# Patient Record
Sex: MALE | Race: WHITE
[De-identification: names, ages, dates, MRNs, and addresses within clinical notes are randomized per-mention and may not be internally consistent; named-entity substitution may affect disease eponyms.]

---

## 2021-05-04 ENCOUNTER — HOSPITAL ENCOUNTER (INPATIENT)
Age: 76
LOS: 11 days | Discharge: HOME | DRG: 949 | End: 2021-05-15
Payer: MEDICARE

## 2021-05-04 VITALS
DIASTOLIC BLOOD PRESSURE: 81 MMHG | TEMPERATURE: 98.5 F | OXYGEN SATURATION: 94 % | HEART RATE: 84 BPM | RESPIRATION RATE: 18 BRPM | SYSTOLIC BLOOD PRESSURE: 158 MMHG

## 2021-05-04 VITALS
TEMPERATURE: 98.2 F | OXYGEN SATURATION: 96 % | SYSTOLIC BLOOD PRESSURE: 129 MMHG | DIASTOLIC BLOOD PRESSURE: 65 MMHG | HEART RATE: 89 BPM | RESPIRATION RATE: 18 BRPM

## 2021-05-04 VITALS — BODY MASS INDEX: 21.9 KG/M2 | BODY MASS INDEX: 23.1 KG/M2

## 2021-05-04 VITALS — RESPIRATION RATE: 16 BRPM | OXYGEN SATURATION: 94 % | HEART RATE: 18 BPM

## 2021-05-04 VITALS — OXYGEN SATURATION: 96 %

## 2021-05-04 VITALS — SYSTOLIC BLOOD PRESSURE: 158 MMHG | DIASTOLIC BLOOD PRESSURE: 81 MMHG | HEART RATE: 84 BPM

## 2021-05-04 DIAGNOSIS — F41.9: ICD-10-CM

## 2021-05-04 DIAGNOSIS — M79.2: ICD-10-CM

## 2021-05-04 DIAGNOSIS — N52.9: ICD-10-CM

## 2021-05-04 DIAGNOSIS — E87.6: ICD-10-CM

## 2021-05-04 DIAGNOSIS — F10.21: ICD-10-CM

## 2021-05-04 DIAGNOSIS — Z79.899: ICD-10-CM

## 2021-05-04 DIAGNOSIS — I12.9: ICD-10-CM

## 2021-05-04 DIAGNOSIS — Z98.1: ICD-10-CM
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
## 2021-05-04 DIAGNOSIS — S06.5X9D: Primary | ICD-10-CM

## 2021-05-04 DIAGNOSIS — Z87.891: ICD-10-CM

## 2021-05-04 DIAGNOSIS — R47.01: ICD-10-CM

## 2021-05-04 DIAGNOSIS — N18.9: ICD-10-CM

## 2021-05-04 DIAGNOSIS — E55.9: ICD-10-CM

## 2021-05-04 PROCEDURE — 36415 COLL VENOUS BLD VENIPUNCTURE: CPT

## 2021-05-04 PROCEDURE — 97535 SELF CARE MNGMENT TRAINING: CPT

## 2021-05-04 PROCEDURE — 80053 COMPREHEN METABOLIC PANEL: CPT

## 2021-05-04 PROCEDURE — 97116 GAIT TRAINING THERAPY: CPT

## 2021-05-04 PROCEDURE — 80048 BASIC METABOLIC PNL TOTAL CA: CPT

## 2021-05-04 PROCEDURE — 87426 SARSCOV CORONAVIRUS AG IA: CPT

## 2021-05-04 PROCEDURE — 97166 OT EVAL MOD COMPLEX 45 MIN: CPT

## 2021-05-04 PROCEDURE — 92523 SPEECH SOUND LANG COMPREHEN: CPT

## 2021-05-04 PROCEDURE — 97110 THERAPEUTIC EXERCISES: CPT

## 2021-05-04 PROCEDURE — 85025 COMPLETE CBC W/AUTO DIFF WBC: CPT

## 2021-05-04 PROCEDURE — 97760 ORTHOTIC MGMT&TRAING 1ST ENC: CPT

## 2021-05-04 PROCEDURE — 97112 NEUROMUSCULAR REEDUCATION: CPT

## 2021-05-04 PROCEDURE — 92507 TX SP LANG VOICE COMM INDIV: CPT

## 2021-05-04 PROCEDURE — 83735 ASSAY OF MAGNESIUM: CPT

## 2021-05-04 PROCEDURE — 97802 MEDICAL NUTRITION INDIV IN: CPT

## 2021-05-04 PROCEDURE — 97530 THERAPEUTIC ACTIVITIES: CPT

## 2021-05-04 PROCEDURE — 97162 PT EVAL MOD COMPLEX 30 MIN: CPT

## 2021-05-04 PROCEDURE — 84100 ASSAY OF PHOSPHORUS: CPT

## 2021-05-05 VITALS
TEMPERATURE: 97.7 F | OXYGEN SATURATION: 95 % | DIASTOLIC BLOOD PRESSURE: 59 MMHG | HEART RATE: 66 BPM | SYSTOLIC BLOOD PRESSURE: 113 MMHG | RESPIRATION RATE: 16 BRPM

## 2021-05-05 VITALS
RESPIRATION RATE: 16 BRPM | DIASTOLIC BLOOD PRESSURE: 80 MMHG | HEART RATE: 72 BPM | OXYGEN SATURATION: 94 % | TEMPERATURE: 97.88 F | SYSTOLIC BLOOD PRESSURE: 145 MMHG

## 2021-05-05 VITALS — HEART RATE: 72 BPM | DIASTOLIC BLOOD PRESSURE: 59 MMHG | SYSTOLIC BLOOD PRESSURE: 113 MMHG

## 2021-05-05 VITALS — HEART RATE: 72 BPM | RESPIRATION RATE: 96 BRPM | OXYGEN SATURATION: 94 %

## 2021-05-05 VITALS — HEART RATE: 72 BPM

## 2021-05-05 VITALS — OXYGEN SATURATION: 97 %

## 2021-05-05 LAB
ALANINE AMINOTRANSFER ALT/SGPT: 19 U/L (ref 16–61)
ALBUMIN SERPL-MCNC: 3.5 G/DL (ref 3.2–5)
ALKALINE PHOSPHATASE: 73 U/L (ref 45–117)
ANION GAP: 6 (ref 5–15)
AST(SGOT): 14 U/L (ref 15–37)
BUN SERPL-MCNC: 20 MG/DL (ref 7–18)
BUN/CREAT RATIO: 23.3 RATIO (ref 10–20)
CALCIUM SERPL-MCNC: 9.3 MG/DL (ref 8.5–10.1)
CARBON DIOXIDE: 28 MMOL/L (ref 21–32)
CHLORIDE: 102 MMOL/L (ref 98–107)
DEPRECATED RDW RBC: 43.8 FL (ref 35.1–43.9)
ERYTHROCYTE [DISTWIDTH] IN BLOOD: 12.5 % (ref 11.6–14.6)
EST GLOM FILT RATE - AFR AMER: 111 ML/MIN (ref 60–?)
ESTIMATED CREATININE CLEARANCE: 70.79 ML/MIN
GLOBULIN: 3.5 G/DL (ref 2.2–4.2)
GLUCOSE: 102 MG/DL (ref 74–106)
HCT VFR BLD AUTO: 43.9 % (ref 40–54)
HEMOGLOBIN: 14.8 G/DL (ref 13–16.5)
HGB BLD-MCNC: 14.8 G/DL (ref 13–16.5)
IMMATURE GRANULOCYTES COUNT: 0.01 X10^3/UL (ref 0–0)
MAGNESIUM: 2.1 MG/DL (ref 1.6–2.6)
MCV RBC: 95.6 FL (ref 80–94)
MEAN CORP HGB CONC: 33.7 G/DL (ref 32–36)
MEAN PLATELET VOL.: 9.6 FL (ref 6.2–12)
NRBC FLAGGED BY ANALYZER: 0 % (ref 0–5)
PLATELET # BLD: 191 K/MM3 (ref 150–450)
PLATELET COUNT: 191 K/MM3 (ref 150–450)
POTASSIUM: 3.6 MMOL/L (ref 3.5–5.1)
RBC # BLD AUTO: 4.59 M/MM3 (ref 4.6–6.2)
RBC DISTRIBUTION WIDTH CV: 12.5 % (ref 11.6–14.6)
RBC DISTRIBUTION WIDTH SD: 43.8 FL (ref 35.1–43.9)
WBC # BLD AUTO: 5.1 K/MM3 (ref 4.4–11)
WHITE BLOOD COUNT: 5.1 K/MM3 (ref 4.4–11)

## 2021-05-06 VITALS — OXYGEN SATURATION: 95 %

## 2021-05-06 VITALS
DIASTOLIC BLOOD PRESSURE: 73 MMHG | SYSTOLIC BLOOD PRESSURE: 149 MMHG | TEMPERATURE: 98.1 F | HEART RATE: 71 BPM | OXYGEN SATURATION: 93 % | RESPIRATION RATE: 18 BRPM

## 2021-05-06 VITALS
DIASTOLIC BLOOD PRESSURE: 73 MMHG | SYSTOLIC BLOOD PRESSURE: 147 MMHG | TEMPERATURE: 98.6 F | RESPIRATION RATE: 18 BRPM | OXYGEN SATURATION: 96 % | HEART RATE: 75 BPM

## 2021-05-06 VITALS — SYSTOLIC BLOOD PRESSURE: 134 MMHG | DIASTOLIC BLOOD PRESSURE: 71 MMHG | HEART RATE: 76 BPM

## 2021-05-06 VITALS — HEART RATE: 71 BPM

## 2021-05-07 VITALS
DIASTOLIC BLOOD PRESSURE: 71 MMHG | OXYGEN SATURATION: 94 % | SYSTOLIC BLOOD PRESSURE: 119 MMHG | RESPIRATION RATE: 12 BRPM | TEMPERATURE: 98.78 F | HEART RATE: 72 BPM

## 2021-05-07 VITALS
HEART RATE: 66 BPM | DIASTOLIC BLOOD PRESSURE: 72 MMHG | OXYGEN SATURATION: 93 % | TEMPERATURE: 98.2 F | RESPIRATION RATE: 12 BRPM | SYSTOLIC BLOOD PRESSURE: 125 MMHG

## 2021-05-07 VITALS — DIASTOLIC BLOOD PRESSURE: 72 MMHG | HEART RATE: 66 BPM | SYSTOLIC BLOOD PRESSURE: 125 MMHG

## 2021-05-07 VITALS — HEART RATE: 72 BPM

## 2021-05-08 VITALS
HEART RATE: 79 BPM | SYSTOLIC BLOOD PRESSURE: 124 MMHG | TEMPERATURE: 98 F | RESPIRATION RATE: 15 BRPM | OXYGEN SATURATION: 94 % | DIASTOLIC BLOOD PRESSURE: 70 MMHG

## 2021-05-08 VITALS — HEART RATE: 64 BPM | DIASTOLIC BLOOD PRESSURE: 70 MMHG | SYSTOLIC BLOOD PRESSURE: 124 MMHG

## 2021-05-08 VITALS
TEMPERATURE: 97.88 F | RESPIRATION RATE: 18 BRPM | OXYGEN SATURATION: 94 % | HEART RATE: 72 BPM | SYSTOLIC BLOOD PRESSURE: 134 MMHG | DIASTOLIC BLOOD PRESSURE: 80 MMHG

## 2021-05-08 VITALS — HEART RATE: 78 BPM

## 2021-05-09 VITALS
OXYGEN SATURATION: 93 % | TEMPERATURE: 98.8 F | HEART RATE: 74 BPM | SYSTOLIC BLOOD PRESSURE: 160 MMHG | RESPIRATION RATE: 18 BRPM | DIASTOLIC BLOOD PRESSURE: 80 MMHG

## 2021-05-09 VITALS
SYSTOLIC BLOOD PRESSURE: 136 MMHG | TEMPERATURE: 98 F | HEART RATE: 79 BPM | DIASTOLIC BLOOD PRESSURE: 82 MMHG | OXYGEN SATURATION: 96 % | RESPIRATION RATE: 16 BRPM

## 2021-05-09 VITALS — HEART RATE: 79 BPM | SYSTOLIC BLOOD PRESSURE: 136 MMHG | DIASTOLIC BLOOD PRESSURE: 82 MMHG

## 2021-05-09 VITALS — HEART RATE: 74 BPM

## 2021-05-10 VITALS
OXYGEN SATURATION: 96 % | RESPIRATION RATE: 16 BRPM | HEART RATE: 75 BPM | DIASTOLIC BLOOD PRESSURE: 81 MMHG | SYSTOLIC BLOOD PRESSURE: 155 MMHG

## 2021-05-10 VITALS — HEART RATE: 75 BPM | SYSTOLIC BLOOD PRESSURE: 155 MMHG | DIASTOLIC BLOOD PRESSURE: 81 MMHG

## 2021-05-10 VITALS
OXYGEN SATURATION: 93 % | SYSTOLIC BLOOD PRESSURE: 129 MMHG | TEMPERATURE: 98.42 F | HEART RATE: 76 BPM | DIASTOLIC BLOOD PRESSURE: 85 MMHG | RESPIRATION RATE: 12 BRPM

## 2021-05-10 VITALS
TEMPERATURE: 98.3 F | OXYGEN SATURATION: 93 % | SYSTOLIC BLOOD PRESSURE: 154 MMHG | RESPIRATION RATE: 18 BRPM | HEART RATE: 93 BPM | DIASTOLIC BLOOD PRESSURE: 84 MMHG

## 2021-05-10 VITALS — HEART RATE: 76 BPM

## 2021-05-11 VITALS
RESPIRATION RATE: 16 BRPM | DIASTOLIC BLOOD PRESSURE: 88 MMHG | SYSTOLIC BLOOD PRESSURE: 139 MMHG | HEART RATE: 83 BPM | OXYGEN SATURATION: 98 % | TEMPERATURE: 98.24 F

## 2021-05-11 VITALS — HEART RATE: 67 BPM

## 2021-05-11 VITALS — DIASTOLIC BLOOD PRESSURE: 88 MMHG | SYSTOLIC BLOOD PRESSURE: 139 MMHG | HEART RATE: 83 BPM

## 2021-05-11 VITALS
TEMPERATURE: 98.3 F | DIASTOLIC BLOOD PRESSURE: 74 MMHG | RESPIRATION RATE: 18 BRPM | HEART RATE: 67 BPM | SYSTOLIC BLOOD PRESSURE: 151 MMHG | OXYGEN SATURATION: 95 %

## 2021-05-11 LAB
ANION GAP: 5 (ref 5–15)
BUN SERPL-MCNC: 25 MG/DL (ref 7–18)
BUN/CREAT RATIO: 29.3 RATIO (ref 10–20)
CALCIUM SERPL-MCNC: 9.1 MG/DL (ref 8.5–10.1)
CARBON DIOXIDE: 29 MMOL/L (ref 21–32)
CHLORIDE: 103 MMOL/L (ref 98–107)
EST GLOM FILT RATE - AFR AMER: 112 ML/MIN (ref 60–?)
ESTIMATED CREATININE CLEARANCE: 68.18 ML/MIN
GLUCOSE: 96 MG/DL (ref 74–106)
MAGNESIUM: 1.9 MG/DL (ref 1.6–2.6)
POTASSIUM: 3.8 MMOL/L (ref 3.5–5.1)

## 2021-05-12 VITALS
SYSTOLIC BLOOD PRESSURE: 146 MMHG | TEMPERATURE: 98.42 F | OXYGEN SATURATION: 94 % | DIASTOLIC BLOOD PRESSURE: 87 MMHG | HEART RATE: 76 BPM | RESPIRATION RATE: 16 BRPM

## 2021-05-12 VITALS — HEART RATE: 71 BPM

## 2021-05-12 VITALS — HEART RATE: 76 BPM | SYSTOLIC BLOOD PRESSURE: 146 MMHG | DIASTOLIC BLOOD PRESSURE: 87 MMHG

## 2021-05-12 VITALS
HEART RATE: 71 BPM | RESPIRATION RATE: 16 BRPM | TEMPERATURE: 98.24 F | DIASTOLIC BLOOD PRESSURE: 81 MMHG | OXYGEN SATURATION: 93 % | SYSTOLIC BLOOD PRESSURE: 135 MMHG

## 2021-05-13 VITALS
SYSTOLIC BLOOD PRESSURE: 148 MMHG | HEART RATE: 73 BPM | OXYGEN SATURATION: 96 % | DIASTOLIC BLOOD PRESSURE: 85 MMHG | RESPIRATION RATE: 16 BRPM | TEMPERATURE: 98.1 F

## 2021-05-13 VITALS — SYSTOLIC BLOOD PRESSURE: 148 MMHG | HEART RATE: 73 BPM | DIASTOLIC BLOOD PRESSURE: 85 MMHG

## 2021-05-13 VITALS
SYSTOLIC BLOOD PRESSURE: 144 MMHG | RESPIRATION RATE: 18 BRPM | HEART RATE: 79 BPM | OXYGEN SATURATION: 94 % | TEMPERATURE: 97.16 F | DIASTOLIC BLOOD PRESSURE: 88 MMHG

## 2021-05-13 VITALS — HEART RATE: 79 BPM

## 2021-05-14 VITALS
OXYGEN SATURATION: 96 % | TEMPERATURE: 98.96 F | HEART RATE: 68 BPM | SYSTOLIC BLOOD PRESSURE: 156 MMHG | DIASTOLIC BLOOD PRESSURE: 89 MMHG | RESPIRATION RATE: 16 BRPM

## 2021-05-14 VITALS — DIASTOLIC BLOOD PRESSURE: 89 MMHG | SYSTOLIC BLOOD PRESSURE: 156 MMHG | HEART RATE: 68 BPM

## 2021-05-14 VITALS
DIASTOLIC BLOOD PRESSURE: 76 MMHG | HEART RATE: 70 BPM | OXYGEN SATURATION: 94 % | SYSTOLIC BLOOD PRESSURE: 144 MMHG | TEMPERATURE: 97.88 F | RESPIRATION RATE: 16 BRPM

## 2021-05-14 VITALS — HEART RATE: 76 BPM

## 2021-05-14 LAB
ANION GAP: 4 (ref 5–15)
BUN SERPL-MCNC: 20 MG/DL (ref 7–18)
BUN/CREAT RATIO: 24.8 RATIO (ref 10–20)
CALCIUM SERPL-MCNC: 8.9 MG/DL (ref 8.5–10.1)
CARBON DIOXIDE: 28 MMOL/L (ref 21–32)
CHLORIDE: 109 MMOL/L (ref 98–107)
EST GLOM FILT RATE - AFR AMER: 119 ML/MIN (ref 60–?)
ESTIMATED CREATININE CLEARANCE: 70.91 ML/MIN
GLUCOSE: 96 MG/DL (ref 74–106)
MAGNESIUM: 2.2 MG/DL (ref 1.6–2.6)
POTASSIUM: 3.9 MMOL/L (ref 3.5–5.1)

## 2021-05-15 VITALS
TEMPERATURE: 97.88 F | DIASTOLIC BLOOD PRESSURE: 77 MMHG | OXYGEN SATURATION: 94 % | SYSTOLIC BLOOD PRESSURE: 145 MMHG | HEART RATE: 68 BPM | RESPIRATION RATE: 16 BRPM

## 2021-05-15 VITALS — HEART RATE: 72 BPM

## 2021-05-15 VITALS
SYSTOLIC BLOOD PRESSURE: 145 MMHG | RESPIRATION RATE: 16 BRPM | DIASTOLIC BLOOD PRESSURE: 77 MMHG | HEART RATE: 69 BPM | OXYGEN SATURATION: 94 % | TEMPERATURE: 97.88 F

## 2023-02-04 PROBLEM — G89.29 CHRONIC PAIN: Status: ACTIVE | Noted: 2023-02-04

## 2023-02-04 PROBLEM — R11.2 NAUSEA AND/OR VOMITING: Status: RESOLVED | Noted: 2023-02-04 | Resolved: 2023-02-04

## 2023-02-04 PROBLEM — N40.1 BENIGN PROSTATIC HYPERPLASIA WITH NOCTURIA: Status: ACTIVE | Noted: 2023-02-04

## 2023-02-04 PROBLEM — G47.00 INSOMNIA: Status: ACTIVE | Noted: 2023-02-04

## 2023-02-04 PROBLEM — M54.9 BACK PAIN: Status: RESOLVED | Noted: 2023-02-04 | Resolved: 2023-02-04

## 2023-02-04 PROBLEM — R27.9 LACK OF COORDINATION: Status: ACTIVE | Noted: 2023-02-04

## 2023-02-04 PROBLEM — R35.1 BENIGN PROSTATIC HYPERPLASIA WITH NOCTURIA: Status: ACTIVE | Noted: 2023-02-04

## 2023-02-04 PROBLEM — K21.9 GERD (GASTROESOPHAGEAL REFLUX DISEASE): Status: ACTIVE | Noted: 2023-02-04

## 2023-02-04 PROBLEM — R55 SYNCOPE: Status: RESOLVED | Noted: 2023-02-04 | Resolved: 2023-02-04

## 2023-02-04 PROBLEM — R79.89 LOW SERUM TESTOSTERONE LEVEL: Status: ACTIVE | Noted: 2023-02-04

## 2023-02-04 PROBLEM — J45.909 RAD (REACTIVE AIRWAY DISEASE) (HHS-HCC): Status: ACTIVE | Noted: 2023-02-04

## 2023-02-04 PROBLEM — R25.2 LEG CRAMPS: Status: ACTIVE | Noted: 2023-02-04

## 2023-02-04 PROBLEM — S06.5XAA POST-TRAUMATIC SUBDURAL HEMATOMA (MULTI): Status: ACTIVE | Noted: 2023-02-04

## 2023-02-04 PROBLEM — M48.062 NEUROGENIC CLAUDICATION DUE TO LUMBAR SPINAL STENOSIS: Status: RESOLVED | Noted: 2023-02-04 | Resolved: 2023-02-04

## 2023-02-04 PROBLEM — S10.16XA TICK BITE OF THROAT: Status: RESOLVED | Noted: 2023-02-04 | Resolved: 2023-02-04

## 2023-02-04 PROBLEM — G62.9 PERIPHERAL NEUROPATHY: Status: ACTIVE | Noted: 2023-02-04
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
## 2023-02-04 PROBLEM — N52.9 ERECTILE DYSFUNCTION: Status: ACTIVE | Noted: 2023-02-04

## 2023-02-04 PROBLEM — R50.9 FEVER: Status: RESOLVED | Noted: 2023-02-04 | Resolved: 2023-02-04

## 2023-02-04 PROBLEM — G25.81 RESTLESS LEGS SYNDROME: Status: ACTIVE | Noted: 2023-02-04

## 2023-02-04 PROBLEM — A69.20 LYME DISEASE: Status: ACTIVE | Noted: 2023-02-04

## 2023-02-04 PROBLEM — E29.1 HYPOGONADISM IN MALE: Status: ACTIVE | Noted: 2023-02-04

## 2023-02-04 PROBLEM — R42 DIZZINESS: Status: ACTIVE | Noted: 2023-02-04

## 2023-02-04 PROBLEM — R00.2 PALPITATIONS: Status: ACTIVE | Noted: 2023-02-04

## 2023-02-04 PROBLEM — I49.3 PVC (PREMATURE VENTRICULAR CONTRACTION): Status: ACTIVE | Noted: 2023-02-04

## 2023-02-04 PROBLEM — I10 BENIGN ESSENTIAL HYPERTENSION: Status: ACTIVE | Noted: 2023-02-04

## 2023-02-04 PROBLEM — M54.12 CERVICAL RADICULOPATHY: Status: ACTIVE | Noted: 2023-02-04

## 2023-02-04 PROBLEM — F41.9 ANXIETY: Status: ACTIVE | Noted: 2023-02-04

## 2023-02-04 PROBLEM — R20.0 NUMBNESS AND TINGLING IN BOTH HANDS: Status: ACTIVE | Noted: 2023-02-04

## 2023-02-04 PROBLEM — M54.6 THORACIC BACK PAIN: Status: ACTIVE | Noted: 2023-02-04

## 2023-02-04 PROBLEM — M25.50 ARTHRALGIA: Status: ACTIVE | Noted: 2023-02-04

## 2023-02-04 PROBLEM — R26.89 BALANCE PROBLEMS: Status: ACTIVE | Noted: 2023-02-04

## 2023-02-04 PROBLEM — R53.1 RIGHT SIDED WEAKNESS: Status: ACTIVE | Noted: 2023-02-04

## 2023-02-04 PROBLEM — R20.2 NUMBNESS AND TINGLING IN BOTH HANDS: Status: ACTIVE | Noted: 2023-02-04

## 2023-02-04 PROBLEM — M54.17 LUMBOSACRAL RADICULOPATHY: Status: ACTIVE | Noted: 2023-02-04

## 2023-02-04 PROBLEM — R53.81 DEBILITY: Status: ACTIVE | Noted: 2023-02-04

## 2023-02-04 PROBLEM — T78.40XA ALLERGIES: Status: ACTIVE | Noted: 2023-02-04

## 2023-02-04 PROBLEM — G45.9 TIA (TRANSIENT ISCHEMIC ATTACK): Status: ACTIVE | Noted: 2023-02-04

## 2023-02-04 PROBLEM — M54.50 LOW BACK PAIN: Status: RESOLVED | Noted: 2023-02-04 | Resolved: 2023-02-04

## 2023-02-04 PROBLEM — R41.841 COGNITIVE COMMUNICATION DEFICIT: Status: ACTIVE | Noted: 2023-02-04

## 2023-02-04 PROBLEM — R13.10 ODYNOPHAGIA: Status: ACTIVE | Noted: 2023-02-04

## 2023-02-04 PROBLEM — M54.2 NECK PAIN: Status: ACTIVE | Noted: 2023-02-04

## 2023-02-04 PROBLEM — R74.8 ELEVATED LIVER ENZYMES: Status: ACTIVE | Noted: 2023-02-04

## 2023-02-04 PROBLEM — E87.6 HYPOKALEMIA: Status: RESOLVED | Noted: 2023-02-04 | Resolved: 2023-02-04

## 2023-02-04 RX ORDER — ACETAMINOPHEN 500 MG/1
CAPSULE, LIQUID FILLED ORAL
COMMUNITY

## 2023-02-04 RX ORDER — AMLODIPINE BESYLATE 2.5 MG/1
1 TABLET ORAL DAILY
COMMUNITY
Start: 2021-06-02 | End: 2023-03-23

## 2023-02-04 RX ORDER — HYDROCODONE BITARTRATE AND ACETAMINOPHEN 7.5; 325 MG/1; MG/1
1 TABLET ORAL 3 TIMES DAILY PRN
COMMUNITY
Start: 2019-10-22 | End: 2023-03-09 | Stop reason: SDUPTHER

## 2023-02-04 RX ORDER — LOSARTAN POTASSIUM 100 MG/1
1 TABLET ORAL DAILY
COMMUNITY
Start: 2021-05-21 | End: 2023-04-24 | Stop reason: SDUPTHER

## 2023-02-04 RX ORDER — ONDANSETRON 4 MG/1
4 TABLET, ORALLY DISINTEGRATING ORAL EVERY 6 HOURS PRN
COMMUNITY
End: 2023-07-20 | Stop reason: SDUPTHER

## 2023-02-04 RX ORDER — BUSPIRONE HYDROCHLORIDE 5 MG/1
1 TABLET ORAL 3 TIMES DAILY
COMMUNITY
Start: 2021-05-21 | End: 2023-03-27 | Stop reason: SDUPTHER

## 2023-02-04 RX ORDER — PREGABALIN 75 MG/1
1 CAPSULE ORAL 2 TIMES DAILY
COMMUNITY
Start: 2021-06-07 | End: 2023-03-27 | Stop reason: SDUPTHER

## 2023-02-04 RX ORDER — METOPROLOL SUCCINATE 50 MG/1
1 TABLET, EXTENDED RELEASE ORAL 2 TIMES DAILY
COMMUNITY
End: 2023-09-22 | Stop reason: SDUPTHER

## 2023-02-04 RX ORDER — MONTELUKAST SODIUM 10 MG/1
10 TABLET ORAL NIGHTLY PRN
COMMUNITY
End: 2024-04-23 | Stop reason: SDUPTHER

## 2023-02-04 RX ORDER — TESTOSTERONE 20.25 MG/1.25G
GEL TOPICAL
COMMUNITY
End: 2023-03-09 | Stop reason: SDUPTHER

## 2023-03-09 DIAGNOSIS — R79.89 LOW SERUM TESTOSTERONE LEVEL: ICD-10-CM

## 2023-03-09 DIAGNOSIS — G89.29 OTHER CHRONIC PAIN: ICD-10-CM

## 2023-03-09 RX ORDER — TESTOSTERONE 20.25 MG/1.25G
1 GEL TOPICAL DAILY
Qty: 75 G | Refills: 0 | Status: SHIPPED | OUTPATIENT
Start: 2023-03-09 | End: 2023-04-12 | Stop reason: SDUPTHER

## 2023-03-09 RX ORDER — HYDROCODONE BITARTRATE AND ACETAMINOPHEN 7.5; 325 MG/1; MG/1
1 TABLET ORAL 3 TIMES DAILY PRN
Qty: 28 TABLET | Refills: 0 | Status: SHIPPED | OUTPATIENT
Start: 2023-03-09 | End: 2023-03-23 | Stop reason: SDUPTHER

## 2023-03-22 ENCOUNTER — LAB (OUTPATIENT)
Dept: LAB | Facility: LAB | Age: 78
End: 2023-03-22
Payer: MEDICARE

## 2023-03-22 ENCOUNTER — TELEPHONE (OUTPATIENT)
Dept: PRIMARY CARE | Facility: CLINIC | Age: 78
End: 2023-03-22

## 2023-03-22 DIAGNOSIS — R19.7 DIARRHEA OF PRESUMED INFECTIOUS ORIGIN: ICD-10-CM

## 2023-03-22 DIAGNOSIS — R19.7 DIARRHEA OF PRESUMED INFECTIOUS ORIGIN: Primary | ICD-10-CM

## 2023-03-22 LAB — C. DIFFICILE TOXIN, PCR: NOT DETECTED

## 2023-03-22 PROCEDURE — 87493 C DIFF AMPLIFIED PROBE: CPT

## 2023-03-22 PROCEDURE — 87329 GIARDIA AG IA: CPT

## 2023-03-22 PROCEDURE — 87328 CRYPTOSPORIDIUM AG IA: CPT

## 2023-03-22 PROCEDURE — 87506 IADNA-DNA/RNA PROBE TQ 6-11: CPT

## 2023-03-23 ENCOUNTER — OFFICE VISIT (OUTPATIENT)
Dept: PRIMARY CARE | Facility: CLINIC | Age: 78
End: 2023-03-23
Payer: MEDICARE

## 2023-03-23 VITALS
OXYGEN SATURATION: 97 % | DIASTOLIC BLOOD PRESSURE: 70 MMHG | HEART RATE: 63 BPM | SYSTOLIC BLOOD PRESSURE: 138 MMHG | HEIGHT: 68 IN | BODY MASS INDEX: 24.55 KG/M2 | WEIGHT: 162 LBS

## 2023-03-23 DIAGNOSIS — G60.3 IDIOPATHIC PROGRESSIVE NEUROPATHY: ICD-10-CM

## 2023-03-23 DIAGNOSIS — G89.29 OTHER CHRONIC PAIN: ICD-10-CM

## 2023-03-23 DIAGNOSIS — M79.605 LEG PAIN, BILATERAL: ICD-10-CM

## 2023-03-23 DIAGNOSIS — E29.1 HYPOGONADISM IN MALE: ICD-10-CM

## 2023-03-23 DIAGNOSIS — S06.5XAS: ICD-10-CM

## 2023-03-23 DIAGNOSIS — Z12.5 PROSTATE CANCER SCREENING: ICD-10-CM

## 2023-03-23 DIAGNOSIS — M54.17 LUMBOSACRAL RADICULOPATHY: Primary | ICD-10-CM

## 2023-03-23 DIAGNOSIS — M79.604 LEG PAIN, BILATERAL: ICD-10-CM

## 2023-03-23 DIAGNOSIS — K21.9 GASTROESOPHAGEAL REFLUX DISEASE WITHOUT ESOPHAGITIS: ICD-10-CM

## 2023-03-23 DIAGNOSIS — I10 BENIGN ESSENTIAL HYPERTENSION: ICD-10-CM

## 2023-03-23 LAB
CAMPYLOBACTER GP: NOT DETECTED
NOROVIRUS GI/GII: NOT DETECTED
ROTAVIRUS A: NOT DETECTED
SALMONELLA SP.: NOT DETECTED
SHIGA TOXIN 1: NOT DETECTED
SHIGA TOXIN 2: NOT DETECTED
SHIGELLA SP.: NOT DETECTED
VIBRIO GRP.: NOT DETECTED
YERSINIA ENTEROCOLITICA: NOT DETECTED

## 2023-03-23 PROCEDURE — 3075F SYST BP GE 130 - 139MM HG: CPT | Performed by: FAMILY MEDICINE

## 2023-03-23 PROCEDURE — 1036F TOBACCO NON-USER: CPT | Performed by: FAMILY MEDICINE

## 2023-03-23 PROCEDURE — 3078F DIAST BP <80 MM HG: CPT | Performed by: FAMILY MEDICINE

## 2023-03-23 PROCEDURE — 1157F ADVNC CARE PLAN IN RCRD: CPT | Performed by: FAMILY MEDICINE

## 2023-03-23 PROCEDURE — 1159F MED LIST DOCD IN RCRD: CPT | Performed by: FAMILY MEDICINE

## 2023-03-23 PROCEDURE — 99214 OFFICE O/P EST MOD 30 MIN: CPT | Performed by: FAMILY MEDICINE

## 2023-03-23 RX ORDER — AMLODIPINE BESYLATE 5 MG/1
1 TABLET ORAL DAILY
COMMUNITY
Start: 2023-03-09 | End: 2023-07-20 | Stop reason: SINTOL

## 2023-03-23 RX ORDER — HYDROCODONE BITARTRATE AND ACETAMINOPHEN 7.5; 325 MG/1; MG/1
1 TABLET ORAL 3 TIMES DAILY PRN
Qty: 28 TABLET | Refills: 0 | Status: SHIPPED | OUTPATIENT
Start: 2023-03-23 | End: 2023-04-22

## 2023-03-23 RX ORDER — PREDNISONE 20 MG/1
40 TABLET ORAL DAILY
Qty: 10 TABLET | Refills: 0 | Status: SHIPPED | OUTPATIENT
Start: 2023-03-23 | End: 2023-03-28

## 2023-03-23 ASSESSMENT — ENCOUNTER SYMPTOMS
RHINORRHEA: 0
ABDOMINAL PAIN: 0
COUGH: 0
OCCASIONAL FEELINGS OF UNSTEADINESS: 0
ARTHRALGIAS: 0
NUMBNESS: 0
DEPRESSION: 0
ABDOMINAL DISTENTION: 0
NAUSEA: 0
APPETITE CHANGE: 0
CONSTIPATION: 0
SHORTNESS OF BREATH: 0
ADENOPATHY: 0
DIZZINESS: 0
DIARRHEA: 0
LOSS OF SENSATION IN FEET: 1
ACTIVITY CHANGE: 0
PALPITATIONS: 0
TROUBLE SWALLOWING: 0
DIFFICULTY URINATING: 0
WHEEZING: 0
UNEXPECTED WEIGHT CHANGE: 0
FATIGUE: 0
VOMITING: 0
HEADACHES: 0
LIGHT-HEADEDNESS: 0

## 2023-03-23 ASSESSMENT — LIFESTYLE VARIABLES: TOTAL SCORE: 5

## 2023-03-23 NOTE — ASSESSMENT & PLAN NOTE
State prescribing database was reviewed today, controlled medicine agreement is up-to-date, patient was advised about using pain medication only as needed, patient may also attempt to reduce and stop his Lyrica per neurology recommendations.

## 2023-03-23 NOTE — PATIENT INSTRUCTIONS
Follow-up in 3 months with blood testing, see Camryn for pain, use pain medicine as needed, alondra if diarrhea worsens or not better in the next week.

## 2023-03-23 NOTE — PROGRESS NOTES
OARRS:  Mahendra Rossi MD on 3/23/2023  9:50 AM  I have personally reviewed the OARRS report for Corey Freedman. I have considered the risks of abuse, dependence, addiction and diversion    Is the patient prescribed a combination of a benzodiazepine and opioid?  No    Last Urine Drug Screen / ordered today: No  Recent Results (from the past 06323 hour(s))   OPIATE/OPIOID/BENZO PRESCRIPTION COMPLIANCE    Collection Time: 08/11/22 11:44 AM   Result Value Ref Range    DRUG SCREEN COMMENT URINE SEE BELOW     Creatine, Urine 52.0 mg/dL    Amphetamine Screen, Urine PRESUMPTIVE NEGATIVE NEGATIVE    Barbiturate Screen, Urine PRESUMPTIVE NEGATIVE NEGATIVE    Cannabinoid Screen, Urine PRESUMPTIVE POSITIVE (A) NEGATIVE    Cocaine Screen, Urine PRESUMPTIVE NEGATIVE NEGATIVE    PCP Screen, Urine PRESUMPTIVE NEGATIVE NEGATIVE    7-Aminoclonazepam <25 Cutoff <25 ng/mL    Alpha-Hydroxyalprazolam <25 Cutoff <25 ng/mL    Alpha-Hydroxymidazolam <25 Cutoff <25 ng/mL    Alprazolam <25 Cutoff <25 ng/mL    Chlordiazepoxide <25 Cutoff <25 ng/mL    Clonazepam <25 Cutoff <25 ng/mL    Diazepam <25 Cutoff <25 ng/mL    Lorazepam <25 Cutoff <25 ng/mL    Midazolam <25 Cutoff <25 ng/mL    Nordiazepam <25 Cutoff <25 ng/mL    Oxazepam <25 Cutoff <25 ng/mL    Temazepam <25 Cutoff <25 ng/mL    Zolpidem <25 Cutoff <25 ng/mL    Zolpidem Metabolite (ZCA) <25 Cutoff <25 ng/mL    6-Acetylmorphine <25 Cutoff <25 ng/mL    Codeine <50 Cutoff <50 ng/mL    Hydrocodone <25 Cutoff <25 ng/mL    Hydromorphone <25 Cutoff <25 ng/mL    Morphine Urine <50 Cutoff <50 ng/mL    Norhydrocodone <25 Cutoff <25 ng/mL    Noroxycodone <25 Cutoff <25 ng/mL    Oxycodone <25 Cutoff <25 ng/mL    Oxymorphone <25 Cutoff <25 ng/mL    Tramadol <50 Cutoff <50 ng/mL    O-Desmethyltramadol <50 Cutoff <50 ng/mL    Fentanyl <2.5 Cutoff<2.5 ng/mL    Norfentanyl <2.5 Cutoff<2.5 ng/mL    METHADONE CONFIRMATION,URINE <25 Cutoff <25 ng/mL    EDDP <25 Cutoff <25 ng/mL     Results are as  expected.     Controlled Substance Agreement:  Date of the Last Agreement: 8/11/22  Reviewed Controlled Substance Agreement including but not limited to the benefits, risks, and alternatives to treatment with a Controlled Substance medication(s).    Opioids:  What is the patient's goal of therapy? To help with pain  Is this being achieved with current treatment? yes    I have calculated the patient's Morphine Dose Equivalent (MED):   I have considered referral to Pain Management and/or a specialist, and do not feel it is necessary at this time.    I feel that it is clinically indicated to continue this current medication regimen after consideration of alternative therapies, and other non-opioid treatment.    Opioid Risk Screening:  Family History of Substance Abuse  Alcohol: 3 (3/23/2023  9:00 AM)  Illegal Drugs: 0 (3/23/2023  9:00 AM)  Prescription Drugs: 0 (3/23/2023  9:00 AM)    Personal History of Substance Abuse  Alcohol: 0 (3/23/2023  9:00 AM)  Illegal Drugs: 0 (3/23/2023  9:00 AM)  Prescription Drugs: 0 (3/23/2023  9:00 AM)    Patient Age (16-45)  Age (16-45): 0 (3/23/2023  9:00 AM)    History of Preadolescent Sexual Abuse  History of Preadolescent Sexual Abuse: .0 (3/23/2023  9:00 AM)    Psychological Disease  Attention Deficit Disorder, Obsessive Compulsive Disorder, Bipolar, Schizophrenia: 2 (3/23/2023  9:00 AM)  Depression: 0 (3/23/2023  9:00 AM)    Total Score  Total Score: 5 (3/23/2023  9:00 AM)    Total Score Risk Category  TOTAL SCORE CATEGORY: Moderate Risk (4-7) (3/23/2023  9:00 AM)        Pain Assessment:  Analgesia  What was your pain level on average during the past week?: 7  What was your pain level at its worst during the past week?: 10 - Pain as bad as it can be  What percentage of your pain has been relieved during the past week?: 70 %  Is the amount of pain relief you are now obtaining from your current pain relievers enough to make a real difference in your life?: Y  Query to Clinician: Is  "the patient's pain relief clinically significant?: Yes    Activities of Daily Living  Physical Functioning: Same  Family Relationships: Same  Social Relationships: Same  Mood: Same  Sleep Patterns: Same  Overall Functioning: Worse    Adverse Events  Is patient experiencing any side effects from current pain relievers?: N    Subjective   Patient ID: Mario Freedman is a 78 y.o. male who presents for 3 MO F/U CSA F/U OPIOID.    HPI   Had issues with pain 2 weeks ago with pain in legs and hips  Has had diarrhea in the past 2 weeks (was in [place]), some better, had bloating and gas, no blood in stool  No headache, chest pain, shortness of breath, dizziness, lightheadedness, or edema  Has seen Zumbar in the past, using pain medicine several times a day in the past 2 weeks, now using 1-2 a day  Seen neurology in the past month, no change in medicines, still has some brain fog and expressive aphasia, has done PT/OT and ST in the past, suggested stopping Lyrica  Emotionally doing OK    Review of Systems   Constitutional:  Negative for activity change, appetite change, fatigue and unexpected weight change.   HENT:  Negative for ear pain, nosebleeds, rhinorrhea, sneezing and trouble swallowing.    Respiratory:  Negative for cough, shortness of breath and wheezing.    Cardiovascular:  Negative for chest pain, palpitations and leg swelling.   Gastrointestinal:  Negative for abdominal distention, abdominal pain, constipation, diarrhea, nausea and vomiting.   Genitourinary:  Negative for difficulty urinating.   Musculoskeletal:  Negative for arthralgias.   Skin:  Negative for rash.   Neurological:  Negative for dizziness, light-headedness, numbness and headaches.   Hematological:  Negative for adenopathy.   Psychiatric/Behavioral:  Negative for behavioral problems.    All other systems reviewed and are negative.      Objective   /70   Pulse 63   Ht 1.727 m (5' 8\")   Wt 73.5 kg (162 lb)   SpO2 97%   BMI " 24.63 kg/m²     Physical Exam  Vitals and nursing note reviewed.   Constitutional:       General: He is not in acute distress.     Appearance: Normal appearance. He is not toxic-appearing.   HENT:      Head: Normocephalic and atraumatic.      Right Ear: Tympanic membrane, ear canal and external ear normal.      Left Ear: Tympanic membrane, ear canal and external ear normal.      Nose: Nose normal.      Mouth/Throat:      Mouth: Mucous membranes are dry.      Pharynx: Oropharynx is clear.   Eyes:      Extraocular Movements: Extraocular movements intact.      Conjunctiva/sclera: Conjunctivae normal.      Pupils: Pupils are equal, round, and reactive to light.   Cardiovascular:      Rate and Rhythm: Normal rate and regular rhythm.   Pulmonary:      Effort: Pulmonary effort is normal.      Breath sounds: Normal breath sounds.   Abdominal:      General: Abdomen is flat. Bowel sounds are normal.      Palpations: Abdomen is soft.   Musculoskeletal:      Cervical back: Normal range of motion and neck supple.   Skin:     General: Skin is warm and dry.      Capillary Refill: Capillary refill takes less than 2 seconds.   Neurological:      General: No focal deficit present.      Mental Status: He is alert and oriented to person, place, and time. Mental status is at baseline.   Psychiatric:         Mood and Affect: Mood normal.         Behavior: Behavior normal.         Assessment/Plan   Problem List Items Addressed This Visit          Nervous    Lumbosacral radiculopathy - Primary    Relevant Orders    Follow Up In Primary Care    Chronic pain     State prescribing database was reviewed today, controlled medicine agreement is up-to-date, patient was advised about using pain medication only as needed, patient may also attempt to reduce and stop his Lyrica per neurology recommendations.         Relevant Medications    HYDROcodone-acetaminophen (Norco) 7.5-325 mg tablet    Other Relevant Orders    Follow Up In Primary Care     Peripheral neuropathy    Relevant Orders    Follow Up In Primary Care    Post-traumatic subdural hematoma    Relevant Orders    Follow Up In Primary Care       Circulatory    Benign essential hypertension     Blood pressure under good control tolerating medication check blood test in 3 months.         Relevant Orders    Comprehensive Metabolic Panel    Follow Up In Primary Care       Digestive    GERD (gastroesophageal reflux disease)    Relevant Orders    Follow Up In Primary Care       Endocrine/Metabolic    Hypogonadism in male     Taking and tolerating testosterone replacement, check laboratory test at next office visit.         Relevant Orders    Comprehensive Metabolic Panel    Testosterone, total and free    CBC    Follow Up In Primary Care     Other Visit Diagnoses       Leg pain, bilateral        Relevant Medications    predniSONE (Deltasone) 20 mg tablet    Other Relevant Orders    Follow Up In Primary Care    Prostate cancer screening        Relevant Orders    Prostate Specific Antigen, Screen

## 2023-03-27 ENCOUNTER — TELEPHONE (OUTPATIENT)
Dept: PRIMARY CARE | Facility: CLINIC | Age: 78
End: 2023-03-27
Payer: MEDICARE

## 2023-03-27 DIAGNOSIS — F41.9 ANXIETY: ICD-10-CM

## 2023-03-27 DIAGNOSIS — G89.29 OTHER CHRONIC PAIN: ICD-10-CM

## 2023-03-27 LAB — GIARDIA LAMBLIA AG: NEGATIVE

## 2023-03-27 RX ORDER — PREGABALIN 75 MG/1
75 CAPSULE ORAL 2 TIMES DAILY
Qty: 180 CAPSULE | Refills: 0 | Status: SHIPPED | OUTPATIENT
Start: 2023-03-27 | End: 2023-06-20 | Stop reason: SDUPTHER

## 2023-03-27 RX ORDER — BUSPIRONE HYDROCHLORIDE 5 MG/1
5 TABLET ORAL 3 TIMES DAILY
Qty: 270 TABLET | Refills: 3 | Status: SHIPPED | OUTPATIENT
Start: 2023-03-27 | End: 2024-01-22 | Stop reason: SDUPTHER

## 2023-03-31 LAB — CRYPTOSPORIDIUM ANTIGEN BY EIA: NEGATIVE

## 2023-04-08 PROBLEM — H00.023: Status: RESOLVED | Noted: 2018-11-05 | Resolved: 2023-04-08

## 2023-04-08 PROBLEM — H53.8 BLURRY VISION, RIGHT EYE: Status: RESOLVED | Noted: 2018-11-05 | Resolved: 2023-04-08

## 2023-04-08 PROBLEM — I10 HTN (HYPERTENSION): Status: RESOLVED | Noted: 2017-06-26 | Resolved: 2023-04-08

## 2023-04-08 PROBLEM — H00.026: Status: RESOLVED | Noted: 2018-11-05 | Resolved: 2023-04-08

## 2023-04-08 PROBLEM — R41.82 ALTERED MENTAL STATUS: Status: RESOLVED | Noted: 2021-02-17 | Resolved: 2023-04-08

## 2023-04-08 PROBLEM — H11.421: Status: RESOLVED | Noted: 2018-12-12 | Resolved: 2023-04-08

## 2023-04-10 ENCOUNTER — OFFICE VISIT (OUTPATIENT)
Dept: PRIMARY CARE | Facility: CLINIC | Age: 78
End: 2023-04-10
Payer: MEDICARE

## 2023-04-10 VITALS
WEIGHT: 162 LBS | BODY MASS INDEX: 23.99 KG/M2 | SYSTOLIC BLOOD PRESSURE: 120 MMHG | DIASTOLIC BLOOD PRESSURE: 62 MMHG | HEIGHT: 69 IN | HEART RATE: 62 BPM

## 2023-04-10 DIAGNOSIS — M54.17 LUMBOSACRAL RADICULOPATHY: Primary | ICD-10-CM

## 2023-04-10 PROCEDURE — 1159F MED LIST DOCD IN RCRD: CPT | Performed by: INTERNAL MEDICINE

## 2023-04-10 PROCEDURE — 1157F ADVNC CARE PLAN IN RCRD: CPT | Performed by: INTERNAL MEDICINE

## 2023-04-10 PROCEDURE — 3078F DIAST BP <80 MM HG: CPT | Performed by: INTERNAL MEDICINE

## 2023-04-10 PROCEDURE — 1036F TOBACCO NON-USER: CPT | Performed by: INTERNAL MEDICINE

## 2023-04-10 PROCEDURE — 97814 ACUP 1/> W/ESTIM EA ADDL 15: CPT | Performed by: INTERNAL MEDICINE

## 2023-04-10 PROCEDURE — 3074F SYST BP LT 130 MM HG: CPT | Performed by: INTERNAL MEDICINE

## 2023-04-10 PROCEDURE — 97813 ACUP 1/> W/ESTIM 1ST 15 MIN: CPT | Performed by: INTERNAL MEDICINE

## 2023-04-10 ASSESSMENT — PATIENT HEALTH QUESTIONNAIRE - PHQ9
1. LITTLE INTEREST OR PLEASURE IN DOING THINGS: NOT AT ALL
SUM OF ALL RESPONSES TO PHQ9 QUESTIONS 1 AND 2: 0
2. FEELING DOWN, DEPRESSED OR HOPELESS: NOT AT ALL

## 2023-04-10 NOTE — PROGRESS NOTES
HERE FOR ACUPUNCTURE FOR LBP     LOW BACK PENS 1.3 HTZ  BL60 , GV20    RIGHT EAR: NADIR MEN, CG , LUMBAR, LR    F/U PRN

## 2023-04-12 DIAGNOSIS — R79.89 LOW SERUM TESTOSTERONE LEVEL: ICD-10-CM

## 2023-04-12 RX ORDER — TESTOSTERONE 20.25 MG/1.25G
1.5 GEL TOPICAL DAILY
Qty: 75 G | Refills: 5 | Status: SHIPPED | OUTPATIENT
Start: 2023-04-12 | End: 2023-07-06 | Stop reason: SDUPTHER

## 2023-04-24 ENCOUNTER — TELEPHONE (OUTPATIENT)
Dept: PRIMARY CARE | Facility: CLINIC | Age: 78
End: 2023-04-24
Payer: MEDICARE

## 2023-04-24 DIAGNOSIS — I10 BENIGN ESSENTIAL HYPERTENSION: ICD-10-CM

## 2023-04-24 RX ORDER — LOSARTAN POTASSIUM 100 MG/1
100 TABLET ORAL DAILY
Qty: 90 TABLET | Refills: 3 | Status: SHIPPED | OUTPATIENT
Start: 2023-04-24 | End: 2024-01-22 | Stop reason: WASHOUT

## 2023-05-18 ENCOUNTER — TELEPHONE (OUTPATIENT)
Dept: PRIMARY CARE | Facility: CLINIC | Age: 78
End: 2023-05-18
Payer: MEDICARE

## 2023-05-18 NOTE — TELEPHONE ENCOUNTER
PATIENT CALLED ASKING FOR A REFILL ON HYDROCODONE/ACETAM  7.5/325MG.  LEAVING FOR OUT WEST NEXT WEEK.

## 2023-05-19 ENCOUNTER — HOSPITAL ENCOUNTER (OUTPATIENT)
Dept: DATA CONVERSION | Facility: HOSPITAL | Age: 78
End: 2023-05-19
Attending: PAIN MEDICINE | Admitting: PAIN MEDICINE
Payer: MEDICARE

## 2023-05-19 DIAGNOSIS — M79.606 PAIN IN LEG, UNSPECIFIED: ICD-10-CM

## 2023-05-19 DIAGNOSIS — A69.20 LYME DISEASE: ICD-10-CM

## 2023-05-19 DIAGNOSIS — M48.062 SPINAL STENOSIS, LUMBAR REGION WITH NEUROGENIC CLAUDICATION: ICD-10-CM

## 2023-05-19 DIAGNOSIS — N40.1 BENIGN PROSTATIC HYPERPLASIA WITH URINARY FREQUENCY: ICD-10-CM

## 2023-05-19 DIAGNOSIS — R52 PAIN, UNSPECIFIED: ICD-10-CM

## 2023-05-19 DIAGNOSIS — M54.17 LUMBOSACRAL RADICULOPATHY: Primary | ICD-10-CM

## 2023-05-19 DIAGNOSIS — R35.0 BENIGN PROSTATIC HYPERPLASIA WITH URINARY FREQUENCY: ICD-10-CM

## 2023-05-19 DIAGNOSIS — M54.50 LOW BACK PAIN, UNSPECIFIED: ICD-10-CM

## 2023-05-19 RX ORDER — HYDROCODONE BITARTRATE AND ACETAMINOPHEN 7.5; 325 MG/1; MG/1
1 TABLET ORAL EVERY 6 HOURS PRN
COMMUNITY
Start: 2019-10-22 | End: 2023-05-19 | Stop reason: SDUPTHER

## 2023-05-19 RX ORDER — HYDROCODONE BITARTRATE AND ACETAMINOPHEN 7.5; 325 MG/1; MG/1
1 TABLET ORAL EVERY 6 HOURS PRN
Qty: 20 TABLET | Refills: 0 | Status: SHIPPED | OUTPATIENT
Start: 2023-05-19 | End: 2023-05-26

## 2023-05-22 ENCOUNTER — TELEPHONE (OUTPATIENT)
Dept: PRIMARY CARE | Facility: CLINIC | Age: 78
End: 2023-05-22
Payer: MEDICARE

## 2023-05-22 RX ORDER — TAMSULOSIN HYDROCHLORIDE 0.4 MG/1
0.4 CAPSULE ORAL DAILY
Qty: 90 CAPSULE | Refills: 3 | Status: SHIPPED | OUTPATIENT
Start: 2023-05-22 | End: 2023-10-05 | Stop reason: ALTCHOICE

## 2023-05-22 NOTE — TELEPHONE ENCOUNTER
Pt states he has been using his Flomax 0.4 mg daily and now needs refill. Med is not on med list. Please advise

## 2023-05-25 ENCOUNTER — TELEPHONE (OUTPATIENT)
Dept: PRIMARY CARE | Facility: CLINIC | Age: 78
End: 2023-05-25
Payer: MEDICARE

## 2023-05-25 NOTE — TELEPHONE ENCOUNTER
PATIENT CALLED AND LEFT MESSAGE.  TRAVELING TODAY FROM  [place] TO [facility].   FOUND TICK ENGORGED  AND GOT IT OUT.   DOES NOT WANT LYME DISEASE  AGAIN.   WANTS TO KNOW IF HE NEEDS SOME DOXY OR OKAY TO WAIT UNTIL Monday ?    DID NOT GIVE ME PHARMACY NAME.

## 2023-05-26 RX ORDER — DOXYCYCLINE 100 MG/1
100 CAPSULE ORAL 2 TIMES DAILY
Qty: 20 CAPSULE | Refills: 0 | Status: SHIPPED | OUTPATIENT
Start: 2023-05-26 | End: 2023-06-05

## 2023-06-20 ENCOUNTER — TELEPHONE (OUTPATIENT)
Dept: PRIMARY CARE | Facility: CLINIC | Age: 78
End: 2023-06-20
Payer: MEDICARE

## 2023-06-20 DIAGNOSIS — G89.29 OTHER CHRONIC PAIN: ICD-10-CM

## 2023-06-20 RX ORDER — PREGABALIN 75 MG/1
75 CAPSULE ORAL 2 TIMES DAILY
Qty: 180 CAPSULE | Refills: 0 | Status: SHIPPED | OUTPATIENT
Start: 2023-06-20 | End: 2023-09-22 | Stop reason: SDUPTHER

## 2023-06-29 ENCOUNTER — LAB (OUTPATIENT)
Dept: LAB | Facility: LAB | Age: 78
End: 2023-06-29
Payer: MEDICARE

## 2023-06-29 DIAGNOSIS — I10 BENIGN ESSENTIAL HYPERTENSION: ICD-10-CM

## 2023-06-29 DIAGNOSIS — Z12.5 PROSTATE CANCER SCREENING: ICD-10-CM

## 2023-06-29 DIAGNOSIS — E29.1 HYPOGONADISM IN MALE: ICD-10-CM

## 2023-06-29 LAB
ALANINE AMINOTRANSFERASE (SGPT) (U/L) IN SER/PLAS: 15 U/L (ref 10–52)
ALBUMIN (G/DL) IN SER/PLAS: 4.5 G/DL (ref 3.4–5)
ALKALINE PHOSPHATASE (U/L) IN SER/PLAS: 61 U/L (ref 33–136)
ANION GAP IN SER/PLAS: 10 MMOL/L (ref 10–20)
ASPARTATE AMINOTRANSFERASE (SGOT) (U/L) IN SER/PLAS: 25 U/L (ref 9–39)
BILIRUBIN TOTAL (MG/DL) IN SER/PLAS: 0.5 MG/DL (ref 0–1.2)
CALCIUM (MG/DL) IN SER/PLAS: 9.4 MG/DL (ref 8.6–10.3)
CARBON DIOXIDE, TOTAL (MMOL/L) IN SER/PLAS: 29 MMOL/L (ref 21–32)
CHLORIDE (MMOL/L) IN SER/PLAS: 106 MMOL/L (ref 98–107)
CREATININE (MG/DL) IN SER/PLAS: 1 MG/DL (ref 0.5–1.3)
ERYTHROCYTE DISTRIBUTION WIDTH (RATIO) BY AUTOMATED COUNT: 12.9 % (ref 11.5–14.5)
ERYTHROCYTE MEAN CORPUSCULAR HEMOGLOBIN CONCENTRATION (G/DL) BY AUTOMATED: 32.3 G/DL (ref 32–36)
ERYTHROCYTE MEAN CORPUSCULAR VOLUME (FL) BY AUTOMATED COUNT: 99 FL (ref 80–100)
ERYTHROCYTES (10*6/UL) IN BLOOD BY AUTOMATED COUNT: 4.92 X10E12/L (ref 4.5–5.9)
GFR MALE: 76 ML/MIN/1.73M2
GLUCOSE (MG/DL) IN SER/PLAS: 91 MG/DL (ref 74–99)
HEMATOCRIT (%) IN BLOOD BY AUTOMATED COUNT: 48.9 % (ref 41–52)
HEMOGLOBIN (G/DL) IN BLOOD: 15.8 G/DL (ref 13.5–17.5)
LEUKOCYTES (10*3/UL) IN BLOOD BY AUTOMATED COUNT: 4.3 X10E9/L (ref 4.4–11.3)
PLATELETS (10*3/UL) IN BLOOD AUTOMATED COUNT: 176 X10E9/L (ref 150–450)
POTASSIUM (MMOL/L) IN SER/PLAS: 4 MMOL/L (ref 3.5–5.3)
PROSTATE SPECIFIC ANTIGEN,SCREEN: 1.17 NG/ML (ref 0–4)
PROTEIN TOTAL: 6.5 G/DL (ref 6.4–8.2)
SODIUM (MMOL/L) IN SER/PLAS: 141 MMOL/L (ref 136–145)
UREA NITROGEN (MG/DL) IN SER/PLAS: 20 MG/DL (ref 6–23)

## 2023-06-29 PROCEDURE — 84403 ASSAY OF TOTAL TESTOSTERONE: CPT

## 2023-06-29 PROCEDURE — 36415 COLL VENOUS BLD VENIPUNCTURE: CPT

## 2023-06-29 PROCEDURE — 85027 COMPLETE CBC AUTOMATED: CPT

## 2023-06-29 PROCEDURE — 80053 COMPREHEN METABOLIC PANEL: CPT

## 2023-06-29 PROCEDURE — G0103 PSA SCREENING: HCPCS

## 2023-06-29 PROCEDURE — 84402 ASSAY OF FREE TESTOSTERONE: CPT

## 2023-07-06 ENCOUNTER — OFFICE VISIT (OUTPATIENT)
Dept: PRIMARY CARE | Facility: CLINIC | Age: 78
End: 2023-07-06
Payer: MEDICARE

## 2023-07-06 VITALS
SYSTOLIC BLOOD PRESSURE: 160 MMHG | HEART RATE: 88 BPM | DIASTOLIC BLOOD PRESSURE: 70 MMHG | HEIGHT: 68 IN | BODY MASS INDEX: 24.58 KG/M2 | WEIGHT: 162.2 LBS | OXYGEN SATURATION: 96 %

## 2023-07-06 DIAGNOSIS — F41.9 ANXIETY: ICD-10-CM

## 2023-07-06 DIAGNOSIS — E29.1 HYPOGONADISM IN MALE: ICD-10-CM

## 2023-07-06 DIAGNOSIS — S06.5XAS: ICD-10-CM

## 2023-07-06 DIAGNOSIS — M79.604 LEG PAIN, BILATERAL: ICD-10-CM

## 2023-07-06 DIAGNOSIS — M79.605 LEG PAIN, BILATERAL: ICD-10-CM

## 2023-07-06 DIAGNOSIS — G89.29 OTHER CHRONIC PAIN: ICD-10-CM

## 2023-07-06 DIAGNOSIS — I10 BENIGN ESSENTIAL HYPERTENSION: ICD-10-CM

## 2023-07-06 DIAGNOSIS — G60.3 IDIOPATHIC PROGRESSIVE NEUROPATHY: ICD-10-CM

## 2023-07-06 DIAGNOSIS — T78.40XS ALLERGY, SEQUELA: Primary | ICD-10-CM

## 2023-07-06 DIAGNOSIS — F51.01 PRIMARY INSOMNIA: ICD-10-CM

## 2023-07-06 DIAGNOSIS — R79.89 LOW SERUM TESTOSTERONE LEVEL: ICD-10-CM

## 2023-07-06 DIAGNOSIS — K21.9 GASTROESOPHAGEAL REFLUX DISEASE WITHOUT ESOPHAGITIS: ICD-10-CM

## 2023-07-06 DIAGNOSIS — M54.17 LUMBOSACRAL RADICULOPATHY: ICD-10-CM

## 2023-07-06 LAB
AMPHETAMINE (PRESENCE) IN URINE BY SCREEN METHOD: NORMAL
BARBITURATES PRESENCE IN URINE BY SCREEN METHOD: NORMAL
BENZODIAZEPINE (PRESENCE) IN URINE BY SCREEN METHOD: NORMAL
CANNABINOIDS IN URINE BY SCREEN METHOD: NORMAL
COCAINE (PRESENCE) IN URINE BY SCREEN METHOD: NORMAL
DRUG SCREEN COMMENT URINE: NORMAL
FENTANYL URINE: NORMAL
METHADONE (PRESENCE) IN URINE BY SCREEN METHOD: NORMAL
OPIATES (PRESENCE) IN URINE BY SCREEN METHOD: NORMAL
OXYCODONE (PRESENCE) IN URINE BY SCREEN METHOD: NORMAL
PHENCYCLIDINE (PRESENCE) IN URINE BY SCREEN METHOD: NORMAL
TESTOSTERONE FREE (CHAN): 46.6 PG/ML (ref 30–135)
TESTOSTERONE,TOTAL,LC-MS/MS: 291 NG/DL (ref 250–1100)

## 2023-07-06 PROCEDURE — 1036F TOBACCO NON-USER: CPT | Performed by: FAMILY MEDICINE

## 2023-07-06 PROCEDURE — 3078F DIAST BP <80 MM HG: CPT | Performed by: FAMILY MEDICINE

## 2023-07-06 PROCEDURE — 3077F SYST BP >= 140 MM HG: CPT | Performed by: FAMILY MEDICINE

## 2023-07-06 PROCEDURE — 99214 OFFICE O/P EST MOD 30 MIN: CPT | Performed by: FAMILY MEDICINE

## 2023-07-06 PROCEDURE — 1157F ADVNC CARE PLAN IN RCRD: CPT | Performed by: FAMILY MEDICINE

## 2023-07-06 PROCEDURE — 1160F RVW MEDS BY RX/DR IN RCRD: CPT | Performed by: FAMILY MEDICINE

## 2023-07-06 PROCEDURE — 1126F AMNT PAIN NOTED NONE PRSNT: CPT | Performed by: FAMILY MEDICINE

## 2023-07-06 PROCEDURE — 1159F MED LIST DOCD IN RCRD: CPT | Performed by: FAMILY MEDICINE

## 2023-07-06 PROCEDURE — 80307 DRUG TEST PRSMV CHEM ANLYZR: CPT

## 2023-07-06 RX ORDER — HYDROCODONE BITARTRATE AND ACETAMINOPHEN 7.5; 325 MG/1; MG/1
1 TABLET ORAL EVERY 6 HOURS PRN
Qty: 30 TABLET | Refills: 0 | Status: SHIPPED | OUTPATIENT
Start: 2023-07-06 | End: 2023-07-13

## 2023-07-06 RX ORDER — TESTOSTERONE 20.25 MG/1.25G
1.5 GEL TOPICAL DAILY
Qty: 75 G | Refills: 5 | Status: SHIPPED | OUTPATIENT
Start: 2023-07-06 | End: 2023-08-11 | Stop reason: SDUPTHER

## 2023-07-06 ASSESSMENT — ENCOUNTER SYMPTOMS
NUMBNESS: 0
COUGH: 0
ABDOMINAL DISTENTION: 0
ADENOPATHY: 0
RHINORRHEA: 0
VOMITING: 0
APPETITE CHANGE: 0
ABDOMINAL PAIN: 0
SHORTNESS OF BREATH: 0
NAUSEA: 0
PALPITATIONS: 0
TROUBLE SWALLOWING: 0
DIFFICULTY URINATING: 0
CONSTIPATION: 0
UNEXPECTED WEIGHT CHANGE: 0
DIZZINESS: 0
NERVOUS/ANXIOUS: 0
LIGHT-HEADEDNESS: 0
WHEEZING: 0
BACK PAIN: 1
SLEEP DISTURBANCE: 0
HEADACHES: 0
FATIGUE: 0
ARTHRALGIAS: 0
ACTIVITY CHANGE: 0
DIARRHEA: 0

## 2023-07-06 ASSESSMENT — LIFESTYLE VARIABLES: TOTAL SCORE: 0

## 2023-07-06 NOTE — ASSESSMENT & PLAN NOTE
Following with pain management and getting acupuncture therapy, renewed hydrocodone per patient's request, continue to follow-up with pain management.

## 2023-07-06 NOTE — PROGRESS NOTES
OARRS:  Mahendra Rossi MD on 7/6/2023  2:54 PM  I have personally reviewed the OARRS report for Corey Freemdan. I have considered the risks of abuse, dependence, addiction and diversion    Is the patient prescribed a combination of a benzodiazepine and opioid?  No    Last Urine Drug Screen / ordered today: Yes  Recent Results (from the past 38977 hour(s))   OPIATE/OPIOID/BENZO PRESCRIPTION COMPLIANCE    Collection Time: 08/11/22 11:44 AM   Result Value Ref Range    DRUG SCREEN COMMENT URINE SEE BELOW     Creatine, Urine 52.0 mg/dL    Amphetamine Screen, Urine PRESUMPTIVE NEGATIVE NEGATIVE    Barbiturate Screen, Urine PRESUMPTIVE NEGATIVE NEGATIVE    Cannabinoid Screen, Urine PRESUMPTIVE POSITIVE (A) NEGATIVE    Cocaine Screen, Urine PRESUMPTIVE NEGATIVE NEGATIVE    PCP Screen, Urine PRESUMPTIVE NEGATIVE NEGATIVE    7-Aminoclonazepam <25 Cutoff <25 ng/mL    Alpha-Hydroxyalprazolam <25 Cutoff <25 ng/mL    Alpha-Hydroxymidazolam <25 Cutoff <25 ng/mL    Alprazolam <25 Cutoff <25 ng/mL    Chlordiazepoxide <25 Cutoff <25 ng/mL    Clonazepam <25 Cutoff <25 ng/mL    Diazepam <25 Cutoff <25 ng/mL    Lorazepam <25 Cutoff <25 ng/mL    Midazolam <25 Cutoff <25 ng/mL    Nordiazepam <25 Cutoff <25 ng/mL    Oxazepam <25 Cutoff <25 ng/mL    Temazepam <25 Cutoff <25 ng/mL    Zolpidem <25 Cutoff <25 ng/mL    Zolpidem Metabolite (ZCA) <25 Cutoff <25 ng/mL    6-Acetylmorphine <25 Cutoff <25 ng/mL    Codeine <50 Cutoff <50 ng/mL    Hydrocodone <25 Cutoff <25 ng/mL    Hydromorphone <25 Cutoff <25 ng/mL    Morphine Urine <50 Cutoff <50 ng/mL    Norhydrocodone <25 Cutoff <25 ng/mL    Noroxycodone <25 Cutoff <25 ng/mL    Oxycodone <25 Cutoff <25 ng/mL    Oxymorphone <25 Cutoff <25 ng/mL    Tramadol <50 Cutoff <50 ng/mL    O-Desmethyltramadol <50 Cutoff <50 ng/mL    Fentanyl <2.5 Cutoff<2.5 ng/mL    Norfentanyl <2.5 Cutoff<2.5 ng/mL    METHADONE CONFIRMATION,URINE <25 Cutoff <25 ng/mL    EDDP <25 Cutoff <25 ng/mL     Results are as  expected.     Controlled Substance Agreement:  Date of the Last Agreement: 8/22/22  Reviewed Controlled Substance Agreement including but not limited to the benefits, risks, and alternatives to treatment with a Controlled Substance medication(s).    Opioids:  What is the patient's goal of therapy? To help with pain  Is this being achieved with current treatment? yes    I have calculated the patient's Morphine Dose Equivalent (MED):   I have considered referral to Pain Management and/or a specialist, and do not feel it is necessary at this time.    I feel that it is clinically indicated to continue this current medication regimen after consideration of alternative therapies, and other non-opioid treatment.    Opioid Risk Screening:  Family History of Substance Abuse  Alcohol: 0 (7/6/2023  2:00 PM)  Illegal Drugs: 0 (7/6/2023  2:00 PM)  Prescription Drugs: 0 (7/6/2023  2:00 PM)    Personal History of Substance Abuse  Alcohol: 0 (7/6/2023  2:00 PM)  Illegal Drugs: 0 (7/6/2023  2:00 PM)  Prescription Drugs: 0 (7/6/2023  2:00 PM)    Patient Age (16-45)  Age (16-45): 0 (7/6/2023  2:00 PM)    History of Preadolescent Sexual Abuse  History of Preadolescent Sexual Abuse: .0 (7/6/2023  2:00 PM)    Psychological Disease  Attention Deficit Disorder, Obsessive Compulsive Disorder, Bipolar, Schizophrenia: 0 (7/6/2023  2:00 PM)  Depression: 0 (7/6/2023  2:00 PM)    Total Score  Total Score: 0 (7/6/2023  2:00 PM)    Total Score Risk Category  TOTAL SCORE CATEGORY: Low Risk (0-3) (7/6/2023  2:00 PM)        Pain Assessment:  Analgesia  What was your pain level on average during the past week?: 4  What was your pain level at its worst during the past week?: 8  What percentage of your pain has been relieved during the past week?: 50 %  Is the amount of pain relief you are now obtaining from your current pain relievers enough to make a real difference in your life?: Y  Query to Clinician: Is the patient's pain relief clinically  significant?: Yes    Activities of Daily Living  Physical Functioning: Same  Family Relationships: Same  Social Relationships: Same  Mood: Same  Sleep Patterns: Same  Overall Functioning: Same    Adverse Events  Is patient experiencing any side effects from current pain relievers?: N  Patient's Overall Severity of Side Effects: None      Assessment  Is your overall impression that this patient is benefiting from opioid therapy?: Yes  Specific Analgesic Plan: Continue present regimen    , Testosterone:  What is the patient's goal of therapy? To treat low testosterone level  Is this being achieved with current treatment? yes    I attest the patient does not have: Prostate Cancer    Last Testosterone check:  Testosterone, Free   Date Value Ref Range Status   05/18/2022 89.6 30.0 - 135.0 pg/mL Final     Comment:     This test was developed and its analytical performance  characteristics have been determined [place] It has  not been cleared or approved by the U.S. Food and Drug  Administration. This assay has been validated pursuant  to the CLIA regulations and is used for clinical  purposes.       Testosterone   Date Value Ref Range Status   10/10/2019 717 240 - 1,000 ng/dL Final     Comment:      Nandrolone decanoate, 11 Beta-hydroxytestosterone, and    11-keto-testosterone strongly cross react with this test    method.    Patients receiving more than 5 mg/day of biotin may have interference   in test results.  A sample should be taken no sooner than eight hours   after previous dose. Contact the testing laboratory for additional   information.       Testosterone, Total, LC-MS/MS   Date Value Ref Range Status   05/18/2022 674 250 - 1,100 ng/dL Final     Comment:     Men with clinically significant hypogonadal  symptoms and testosterone values repeatedly in  the range of the 200-300 ng/dL or less, may  benefit from testosterone treatment after  adequate risk and benefits  "counseling.            For additional information, please refer to  http://education.[employer]/faq/  GqbkiEywebcdkgtjsAMPJUQBSN289  (This link is being provided for informational/  educational purposes only.)     This test was developed and its analytical performance  characteristics have been determined by [facility] [place], [place]. It has  not been cleared or approved by the U.S. Food and Drug  Administration. This assay has been validated pursuant  to the CLIA regulations and is used for clinical  purposes.         Last CBC:    No results found for: \"CBCDIF\", \"BMCBC\", \"PR1\"    Last PSA:   Prostate Specific Antigen,Screen   Date Value Ref Range Status   06/29/2023 1.17 0.00 - 4.00 ng/mL Final     Comment:     The FDA requires that the method used for PSA assay be   reported to the physician. Values obtained with different   assay methods must not be used interchangeably. This test  was performed at [facility] using the Access   Hybritech PSA assay is a two-site immunoenzymatic sandwich   assay. The assay is approved for measurement of   prostate-specific antigen (PSA)in serum and may be used   in conjunction with a digital rectal examination in men   50 years and older as an aid in detection of prostate   cancer.  5-Alpha-reductase inhibitors (e.g. Proscar, Finasteride,   Avodart, Dutasteride and Kia) for the treatment of BPH   have been shown to lower PSA levels by an average of 50%   after 6 months of treatment.       Activities of Daily Living:   Is your overall impression that this patient is benefiting (symptom reduction outweighs side effects) from testosterone therapy? Yes     1. Physical Functioning: Better  2. Family Relationship: Same  3. Social Relationship: Same  4. Mood: Same  5. Sleep Patterns: Same  6. Overall Function: Better      , and Lyrica:  What is the patient's goal of therapy? To help with pain  Is this being achieved with current " treatment? yes    Pain Assessment:  Analgesia  What was your pain level on average during the past week?: 4  What was your pain level at its worst during the past week?: 8  What percentage of your pain has been relieved during the past week?: 50 %  Is the amount of pain relief you are now obtaining from your current pain relievers enough to make a real difference in your life?: Y  Query to Clinician: Is the patient's pain relief clinically significant?: Yes    Activities of Daily Living  Physical Functioning: Same  Family Relationships: Same  Social Relationships: Same  Mood: Same  Sleep Patterns: Same  Overall Functioning: Same    Adverse Events  Is patient experiencing any side effects from current pain relievers?: N  Patient's Overall Severity of Side Effects: None      Assessment  Is your overall impression that this patient is benefiting from opioid therapy?: Yes  Specific Analgesic Plan: Continue present regimen        Activities of Daily Living:  Is your overall impression that this patient is benefiting (symptom reduction outweighs side effects) from Lyrica therapy? Yes     1. Physical Functioning: Same  2. Family Relationship: Same  3. Social Relationship: Same  4. Mood: Same  5. Sleep Patterns: Same  6. Overall Function: BetterSubjective   Patient ID: Corey Freedman is a 78 y.o. male who presents for 3 mo labs.    HPI   No headache, chest pain, shortness of breath, dizziness, lightheadedness, + edema  Had another tick bite, no issues, took Doxy  HBP less than 140/90  No constipation issues  Anxiety doing well, sleeps OK at night  Has JOAN for LBP and hip injections, considering decompression surgery      Review of Systems   Constitutional:  Negative for activity change, appetite change, fatigue and unexpected weight change.   HENT:  Negative for ear pain, nosebleeds, rhinorrhea, sneezing and trouble swallowing.    Respiratory:  Negative for cough, shortness of breath and wheezing.    Cardiovascular:   "Negative for chest pain, palpitations and leg swelling.   Gastrointestinal:  Negative for abdominal distention, abdominal pain, constipation, diarrhea, nausea and vomiting.   Genitourinary:  Negative for difficulty urinating.   Musculoskeletal:  Positive for back pain. Negative for arthralgias.   Skin:  Negative for rash.   Neurological:  Negative for dizziness, light-headedness, numbness and headaches.   Hematological:  Negative for adenopathy.   Psychiatric/Behavioral:  Negative for behavioral problems and sleep disturbance. The patient is not nervous/anxious.    All other systems reviewed and are negative.      Objective   /70   Pulse 88   Ht 1.727 m (5' 8\")   Wt 73.6 kg (162 lb 3.2 oz)   SpO2 96%   BMI 24.66 kg/m²     Physical Exam  Vitals and nursing note reviewed.   Constitutional:       Appearance: Normal appearance.   Cardiovascular:      Rate and Rhythm: Normal rate and regular rhythm.      Pulses: Normal pulses.      Heart sounds: Normal heart sounds.      Comments: +1 edema at the ankles bilaterally.  Pulmonary:      Effort: Pulmonary effort is normal.      Breath sounds: Normal breath sounds.   Skin:     General: Skin is warm and dry.   Neurological:      General: No focal deficit present.      Mental Status: He is alert and oriented to person, place, and time. Mental status is at baseline.   Psychiatric:         Mood and Affect: Mood normal.         Behavior: Behavior normal.         Assessment/Plan   Problem List Items Addressed This Visit       Allergies - Primary     Currently stable with medication, no change.         Relevant Orders    Follow Up In Primary Care - Established    Anxiety     Emotionally seems to be doing okay currently.  No change.         Relevant Orders    Follow Up In Primary Care - Established    Benign essential hypertension     Blood pressure slightly elevated today, recheck was normal, home blood pressures below 140/90, renal function is stable, no change.  If edema " becomes worse we will change amlodipine to diuretic.  Patient had been on hydrochlorothiazide before, this was discontinued when he was in rehab hospital due to hypokalemia.         Relevant Orders    Follow Up In Primary Care - Established    Lumbosacral radiculopathy     Following with pain management and getting acupuncture therapy, renewed hydrocodone per patient's request, continue to follow-up with pain management.         Relevant Medications    HYDROcodone-acetaminophen (Norco) 7.5-325 mg tablet    Other Relevant Orders    Follow Up In Primary Care - Established    Chronic pain    Relevant Medications    HYDROcodone-acetaminophen (Norco) 7.5-325 mg tablet    Other Relevant Orders    Follow Up In Primary Care - Established    Drug Screen, Urine With Reflex to Confirmation    GERD (gastroesophageal reflux disease)     Stable with current medication.         Relevant Orders    Follow Up In Primary Care - Established    Hypogonadism in male     Continue with testosterone replacement, CBC, PSA, liver function testing are all normal, testosterone level still pending.         Relevant Orders    Follow Up In Primary Care - Established    Insomnia    Relevant Orders    Follow Up In Primary Care - Established    Low serum testosterone level    Relevant Medications    testosterone 20.25 mg/1.25 gram (1.62 %) gel in metered-dose pump    Other Relevant Orders    Follow Up In Primary Care - Established    Drug Screen, Urine With Reflex to Confirmation    Peripheral neuropathy    Relevant Orders    Follow Up In Primary Care - Established    Post-traumatic subdural hematoma (CMS/HCC)     No focal neurologic issues today, memory issues are okay, no issues with balance.         Relevant Orders    Follow Up In Primary Care - Established     Other Visit Diagnoses       Leg pain, bilateral        Relevant Medications    HYDROcodone-acetaminophen (Norco) 7.5-325 mg tablet    Other Relevant Orders    Follow Up In Primary Care -  Established    Drug Screen, Urine With Reflex to Confirmation

## 2023-07-06 NOTE — ASSESSMENT & PLAN NOTE
Blood pressure slightly elevated today, recheck was normal, home blood pressures below 140/90, renal function is stable, no change.  If edema becomes worse we will change amlodipine to diuretic.  Patient had been on hydrochlorothiazide before, this was discontinued when he was in rehab hospital due to hypokalemia.

## 2023-07-06 NOTE — ASSESSMENT & PLAN NOTE
Continue with testosterone replacement, CBC, PSA, liver function testing are all normal, testosterone level still pending.

## 2023-07-07 ENCOUNTER — TELEPHONE (OUTPATIENT)
Dept: PRIMARY CARE | Facility: CLINIC | Age: 78
End: 2023-07-07
Payer: MEDICARE

## 2023-07-07 NOTE — TELEPHONE ENCOUNTER
Patient called asking if acupuncture would work for peripheral neuropathy, if so he will schedule an appointment with you.

## 2023-07-19 ENCOUNTER — OFFICE VISIT (OUTPATIENT)
Dept: PRIMARY CARE | Facility: CLINIC | Age: 78
End: 2023-07-19
Payer: MEDICARE

## 2023-07-19 VITALS — SYSTOLIC BLOOD PRESSURE: 143 MMHG | DIASTOLIC BLOOD PRESSURE: 72 MMHG | HEART RATE: 62 BPM

## 2023-07-19 DIAGNOSIS — G60.3 IDIOPATHIC PROGRESSIVE NEUROPATHY: Primary | ICD-10-CM

## 2023-07-19 PROCEDURE — 1126F AMNT PAIN NOTED NONE PRSNT: CPT | Performed by: INTERNAL MEDICINE

## 2023-07-19 PROCEDURE — 97814 ACUP 1/> W/ESTIM EA ADDL 15: CPT | Performed by: INTERNAL MEDICINE

## 2023-07-19 PROCEDURE — 3078F DIAST BP <80 MM HG: CPT | Performed by: INTERNAL MEDICINE

## 2023-07-19 PROCEDURE — 1159F MED LIST DOCD IN RCRD: CPT | Performed by: INTERNAL MEDICINE

## 2023-07-19 PROCEDURE — 1036F TOBACCO NON-USER: CPT | Performed by: INTERNAL MEDICINE

## 2023-07-19 PROCEDURE — 3077F SYST BP >= 140 MM HG: CPT | Performed by: INTERNAL MEDICINE

## 2023-07-19 PROCEDURE — 1157F ADVNC CARE PLAN IN RCRD: CPT | Performed by: INTERNAL MEDICINE

## 2023-07-19 PROCEDURE — 1160F RVW MEDS BY RX/DR IN RCRD: CPT | Performed by: INTERNAL MEDICINE

## 2023-07-19 PROCEDURE — 97813 ACUP 1/> W/ESTIM 1ST 15 MIN: CPT | Performed by: INTERNAL MEDICINE

## 2023-07-19 NOTE — PROGRESS NOTES
Here for acupuncture for feet neuropathy    B/l interweb  LR3 [id number]    KI3...SP6  1.3    R EAR ASP SHAN LOYA LR HAND    FU 2 WEEKS

## 2023-07-20 DIAGNOSIS — I10 BENIGN ESSENTIAL HYPERTENSION: Primary | ICD-10-CM

## 2023-07-20 DIAGNOSIS — R11.2 NAUSEA AND VOMITING, UNSPECIFIED VOMITING TYPE: ICD-10-CM

## 2023-07-20 RX ORDER — ONDANSETRON 4 MG/1
4 TABLET, ORALLY DISINTEGRATING ORAL EVERY 6 HOURS PRN
Qty: 90 TABLET | Refills: 1 | Status: SHIPPED | OUTPATIENT
Start: 2023-07-20 | End: 2023-10-05 | Stop reason: SDUPTHER

## 2023-07-20 RX ORDER — HYDROCHLOROTHIAZIDE 25 MG/1
25 TABLET ORAL DAILY
Qty: 30 TABLET | Refills: 11 | Status: SHIPPED | OUTPATIENT
Start: 2023-07-20 | End: 2023-08-11 | Stop reason: SDUPTHER

## 2023-08-03 ENCOUNTER — LAB (OUTPATIENT)
Dept: LAB | Facility: LAB | Age: 78
End: 2023-08-03
Payer: MEDICARE

## 2023-08-03 DIAGNOSIS — I10 BENIGN ESSENTIAL HYPERTENSION: ICD-10-CM

## 2023-08-03 LAB
ANION GAP IN SER/PLAS: 11 MMOL/L (ref 10–20)
CALCIUM (MG/DL) IN SER/PLAS: 9.4 MG/DL (ref 8.6–10.3)
CARBON DIOXIDE, TOTAL (MMOL/L) IN SER/PLAS: 30 MMOL/L (ref 21–32)
CHLORIDE (MMOL/L) IN SER/PLAS: 103 MMOL/L (ref 98–107)
CREATININE (MG/DL) IN SER/PLAS: 0.99 MG/DL (ref 0.5–1.3)
GFR MALE: 77 ML/MIN/1.73M2
GLUCOSE (MG/DL) IN SER/PLAS: 88 MG/DL (ref 74–99)
POTASSIUM (MMOL/L) IN SER/PLAS: 4.1 MMOL/L (ref 3.5–5.3)
SODIUM (MMOL/L) IN SER/PLAS: 140 MMOL/L (ref 136–145)
UREA NITROGEN (MG/DL) IN SER/PLAS: 20 MG/DL (ref 6–23)

## 2023-08-03 PROCEDURE — 36415 COLL VENOUS BLD VENIPUNCTURE: CPT

## 2023-08-03 PROCEDURE — 80048 BASIC METABOLIC PNL TOTAL CA: CPT

## 2023-08-11 DIAGNOSIS — M54.17 LUMBOSACRAL RADICULOPATHY: Primary | ICD-10-CM

## 2023-08-11 DIAGNOSIS — R79.89 LOW SERUM TESTOSTERONE LEVEL: ICD-10-CM

## 2023-08-11 DIAGNOSIS — I10 BENIGN ESSENTIAL HYPERTENSION: ICD-10-CM

## 2023-08-11 RX ORDER — HYDROCHLOROTHIAZIDE 25 MG/1
25 TABLET ORAL DAILY
Qty: 90 TABLET | Refills: 3 | Status: SHIPPED | OUTPATIENT
Start: 2023-08-11 | End: 2024-01-22 | Stop reason: WASHOUT

## 2023-08-11 RX ORDER — TESTOSTERONE 20.25 MG/1.25G
2 GEL TOPICAL DAILY
Qty: 75 G | Refills: 5 | Status: SHIPPED | OUTPATIENT
Start: 2023-08-11 | End: 2023-08-29 | Stop reason: SDUPTHER

## 2023-08-15 RX ORDER — HYDROCODONE BITARTRATE AND ACETAMINOPHEN 7.5; 325 MG/1; MG/1
1 TABLET ORAL EVERY 6 HOURS PRN
Qty: 30 TABLET | Refills: 0 | Status: SHIPPED | OUTPATIENT
Start: 2023-08-15 | End: 2023-08-22

## 2023-08-24 ENCOUNTER — TELEPHONE (OUTPATIENT)
Dept: PRIMARY CARE | Facility: CLINIC | Age: 78
End: 2023-08-24
Payer: MEDICARE

## 2023-08-24 DIAGNOSIS — L25.5 RHUS DERMATITIS: Primary | ICD-10-CM

## 2023-08-24 RX ORDER — TRIAMCINOLONE ACETONIDE 1 MG/G
CREAM TOPICAL 2 TIMES DAILY
Qty: 28.4 G | Refills: 1 | Status: SHIPPED | OUTPATIENT
Start: 2023-08-24 | End: 2023-10-05 | Stop reason: WASHOUT

## 2023-08-24 RX ORDER — PREDNISONE 10 MG/1
TABLET ORAL
Qty: 30 TABLET | Refills: 1 | Status: SHIPPED | OUTPATIENT
Start: 2023-08-24 | End: 2023-09-05

## 2023-08-24 NOTE — TELEPHONE ENCOUNTER
Pt states he and his wife have gotten poison ivy and are going to [place] and was wondering if he could get prednisone sent into [facility]

## 2023-08-25 ENCOUNTER — HOSPITAL ENCOUNTER (OUTPATIENT)
Dept: DATA CONVERSION | Facility: HOSPITAL | Age: 78
End: 2023-08-25
Attending: PAIN MEDICINE | Admitting: PAIN MEDICINE
Payer: MEDICARE

## 2023-08-25 DIAGNOSIS — M54.17 RADICULOPATHY, LUMBOSACRAL REGION: ICD-10-CM

## 2023-08-29 DIAGNOSIS — R79.89 LOW SERUM TESTOSTERONE LEVEL: ICD-10-CM

## 2023-08-29 PROBLEM — M47.817 LUMBOSACRAL SPONDYLOSIS: Status: ACTIVE | Noted: 2023-08-29

## 2023-08-29 PROBLEM — M70.62 TROCHANTERIC BURSITIS OF BOTH HIPS: Status: ACTIVE | Noted: 2023-08-29

## 2023-08-29 PROBLEM — M70.61 TROCHANTERIC BURSITIS OF BOTH HIPS: Status: ACTIVE | Noted: 2023-08-29

## 2023-08-29 RX ORDER — AMLODIPINE BESYLATE 5 MG/1
5 TABLET ORAL DAILY
COMMUNITY
End: 2023-09-22 | Stop reason: SDUPTHER

## 2023-08-29 RX ORDER — MUPIROCIN CALCIUM 20 MG/G
CREAM TOPICAL
COMMUNITY

## 2023-08-29 RX ORDER — BACITRACIN 500 [USP'U]/G
OINTMENT OPHTHALMIC
COMMUNITY
Start: 2018-11-05 | End: 2023-11-06 | Stop reason: ALTCHOICE

## 2023-08-29 RX ORDER — TESTOSTERONE 20.25 MG/1.25G
2 GEL TOPICAL DAILY
Qty: 75 G | Refills: 5 | Status: SHIPPED | OUTPATIENT
Start: 2023-08-29 | End: 2023-09-22 | Stop reason: SDUPTHER

## 2023-09-06 ENCOUNTER — TELEPHONE (OUTPATIENT)
Dept: PRIMARY CARE | Facility: CLINIC | Age: 78
End: 2023-09-06
Payer: MEDICARE

## 2023-09-06 DIAGNOSIS — M54.17 LUMBOSACRAL RADICULOPATHY: Primary | ICD-10-CM

## 2023-09-06 NOTE — TELEPHONE ENCOUNTER
PATIENT ERICA  WITH NEUROSURGEON 10-6-2023.   HE WOULD LIKE MRI OF LS SPINE  WITHOUT CONTRAST PRIOR TO APPT.    HE  WOULD LIKE YOU ORDER.   WILL BE BACK FROM [place]. 9-22-23.   OK TO ORDER ?

## 2023-09-07 VITALS — HEIGHT: 69 IN | BODY MASS INDEX: 23.87 KG/M2 | WEIGHT: 161.16 LBS

## 2023-09-14 DIAGNOSIS — F41.9 ANXIETY: Primary | ICD-10-CM

## 2023-09-14 RX ORDER — BUSPIRONE HYDROCHLORIDE 5 MG/1
5 TABLET ORAL 3 TIMES DAILY
Qty: 45 TABLET | Refills: 0 | Status: SHIPPED | OUTPATIENT
Start: 2023-09-14 | End: 2023-10-05 | Stop reason: WASHOUT

## 2023-09-14 RX ORDER — BUSPIRONE HYDROCHLORIDE 5 MG/1
5 TABLET ORAL 3 TIMES DAILY
COMMUNITY
End: 2023-09-14 | Stop reason: SDUPTHER

## 2023-09-22 ENCOUNTER — TELEPHONE (OUTPATIENT)
Dept: PRIMARY CARE | Facility: CLINIC | Age: 78
End: 2023-09-22
Payer: MEDICARE

## 2023-09-22 DIAGNOSIS — R79.89 LOW SERUM TESTOSTERONE LEVEL: ICD-10-CM

## 2023-09-22 DIAGNOSIS — I10 BENIGN ESSENTIAL HYPERTENSION: Primary | ICD-10-CM

## 2023-09-22 DIAGNOSIS — G89.29 OTHER CHRONIC PAIN: ICD-10-CM

## 2023-09-22 RX ORDER — METOPROLOL SUCCINATE 50 MG/1
50 TABLET, EXTENDED RELEASE ORAL 2 TIMES DAILY
Qty: 180 TABLET | Refills: 3 | Status: SHIPPED | OUTPATIENT
Start: 2023-09-22 | End: 2024-09-21

## 2023-09-22 RX ORDER — TESTOSTERONE 20.25 MG/1.25G
2 GEL TOPICAL DAILY
Qty: 75 G | Refills: 5 | Status: SHIPPED | OUTPATIENT
Start: 2023-09-22 | End: 2024-01-22 | Stop reason: SDUPTHER

## 2023-09-22 RX ORDER — PREGABALIN 75 MG/1
75 CAPSULE ORAL 2 TIMES DAILY
Qty: 180 CAPSULE | Refills: 0 | Status: SHIPPED | OUTPATIENT
Start: 2023-09-22 | End: 2023-10-23 | Stop reason: SDUPTHER

## 2023-09-22 RX ORDER — AMLODIPINE BESYLATE 5 MG/1
5 TABLET ORAL DAILY
Qty: 90 TABLET | Refills: 3 | Status: SHIPPED | OUTPATIENT
Start: 2023-09-22 | End: 2023-10-05 | Stop reason: SINTOL

## 2023-09-28 ENCOUNTER — TELEPHONE (OUTPATIENT)
Dept: PRIMARY CARE | Facility: CLINIC | Age: 78
End: 2023-09-28
Payer: MEDICARE

## 2023-09-28 DIAGNOSIS — U07.1 COVID-19: Primary | ICD-10-CM

## 2023-09-28 NOTE — TELEPHONE ENCOUNTER
PATIENT STARTED WITH CO VID SYMPTOMS  2 DAYS AGO,  TESTED POSITIVE FOR CO VID YESTERDAY.     WOULD LIKE PAXLOVID,   ALSO   OKAY TO USE SINGULAIR ?   [facility]

## 2023-09-29 VITALS — WEIGHT: 156.53 LBS | BODY MASS INDEX: 23.8 KG/M2

## 2023-10-01 NOTE — OP NOTE
PROCEDURE DETAILS    Preoperative Diagnosis:  Radiculopathy, lumbosacral region, M54.17    Postoperative Diagnosis:  Radiculopathy, lumbosacral region, M54.17    Surgeon: Ross Cowan  Resident/Fellow/Other Assistant: None of these were associated with this case    Procedure:  1. L4-L5 JOAN    Anesthesia: No anesthesiologist associated with this case  Estimated Blood Loss: 0  Findings: NA  Additional Details: The patient has a greater than 2-month history of severe low back and leg pain.  The patient has previously had 6 weeks of conservative management with exercise therapy  and medications.  The patient is compliant with home exercises for this issue.  The pain significantly interrupts the patient's physical function.  The patient does not desire spine surgery.  The patient had undergone a previous lumbar epidural injection  on May 19, 2023 and obtained greater than 60% pain relief and functional improvement for 3 months.  During that time he could walk for 2 miles.  Now he cannot walk 1 mile.        Operative Report:   Procedure: Interlaminar lumbar epidural steroid injection under fluoroscopic guidance at the L5-S1 interspace  Diagnosis: Lumbosacral radiculopathy  Solution: 1 mL of Kenalog 40 mg, 2 mL of lidocaine 2%, 5 mL normal saline, 8 mL total volume  Total contrast: 2 mL Omnipaque  Anesthesia: Local  Complications: None    After informed consent was obtained, the patient was brought to the OR and placed in the prone position. The area in question was prepped and draped  in sterile fashion. An AP fluoroscopic view of the lumbar spine was obtained and after 5 mL of lidocaine 1% was injected into the skin, a 17-gauge Touhy needle was inserted into the skin and advanced toward the L5-S1 interspace under intermittent fluoroscopic  guidance. The epidural space was identified via loss of resistance to air. Proper needle position was confirmed by AP and lateral fluoroscopy. Contrast was administered  under live fluoroscopy and demonstrated appropriate epidural uptake and the absence  of any intravascular or intrathecal spread. The local anesthetic steroid solution was then injected incrementally. The needle was removed. Bleeding was minimal. The patient tolerated the procedure well and was transferred to the recovery room in good  condition.                        Attestation:   Note Completion:  Attending Attestation I performed the procedure without a resident         Electronic Signatures:  Ross Cowan)  (Signed 25-Aug-2023 17:05)   Authored: Post-Operative Note, Chart Review, Note Completion      Last Updated: 25-Aug-2023 17:05 by Ross Cowan)

## 2023-10-02 NOTE — OP NOTE
PROCEDURE DETAILS    Preoperative Diagnosis:  Spinal stenosis of lumbar region with neurogenic claudication, M48.062    Postoperative Diagnosis:  Spinal stenosis of lumbar region with neurogenic claudication, M48.062    Surgeon: Ross Cowan  Resident/Fellow/Other Assistant: None of these were associated with this case    Procedure:  1. L4-5 JOAN    Anesthesia: No anesthesiologist associated with this case  Estimated Blood Loss: 0  Findings: NA  Additional Details: The patient has a greater than 2-month history of severe low back and leg pain.  The patient has previously had 6 weeks of conservative management with exercise therapy  and medications.  The patient is compliant with home exercises for this issue.  The pain significantly interrupts the patient's physical function.  The patient does not desire spine surgery.  The patient had undergone a previous epidural injection and  obtained greater than 60% pain relief and functional improvement over 3 months.  His MRI is notable for severe spinal stenosis at L4-L5.  The pain prevents him from lifting heavy weights.        Operative Report:   Procedure: Interlaminar lumbar epidural steroid injection under fluoroscopic guidance at the L4-L5 interspace  Diagnosis: Lumbar stenosis with neurogenic claudication  Solution: 1 mL of Kenalog 40 mg, 2 mL of lidocaine 2%, 5 mL normal saline, 8 mL total volume  Total contrast: 2 mL Omnipaque  Anesthesia: Local  Complications: None    After informed consent was obtained, the patient was brought to the OR and placed in the prone position. The area in question was prepped and draped  in sterile fashion. An AP fluoroscopic view of the lumbar spine was obtained and after 5 mL of lidocaine 1% was injected into the skin, a 17-gauge Touhy needle was inserted into the skin and advanced toward the L4-L5 interspace under intermittent fluoroscopic  guidance. The epidural space was identified via loss of resistance to air. Proper  needle position was confirmed by AP and lateral fluoroscopy. Contrast was administered under live fluoroscopy and demonstrated appropriate epidural uptake and the absence  of any intravascular or intrathecal spread. The local anesthetic steroid solution was then injected incrementally. The needle was removed. Bleeding was minimal. The patient tolerated the procedure well and was transferred to the recovery room in good  condition.                        Attestation:   Note Completion:  Attending Attestation I performed the procedure without a resident         Electronic Signatures:  Ross Cowan)  (Signed 19-May-2023 20:21)   Authored: Post-Operative Note, Chart Review, Note Completion      Last Updated: 19-May-2023 20:21 by Ross Cowan)

## 2023-10-05 ENCOUNTER — APPOINTMENT (OUTPATIENT)
Dept: PAIN MEDICINE | Facility: CLINIC | Age: 78
End: 2023-10-05
Payer: MEDICARE

## 2023-10-05 ENCOUNTER — OFFICE VISIT (OUTPATIENT)
Dept: PRIMARY CARE | Facility: CLINIC | Age: 78
End: 2023-10-05
Payer: MEDICARE

## 2023-10-05 VITALS
DIASTOLIC BLOOD PRESSURE: 80 MMHG | WEIGHT: 156.9 LBS | HEART RATE: 75 BPM | HEIGHT: 68 IN | SYSTOLIC BLOOD PRESSURE: 130 MMHG | BODY MASS INDEX: 23.78 KG/M2 | OXYGEN SATURATION: 95 %

## 2023-10-05 DIAGNOSIS — E29.1 HYPOGONADISM IN MALE: ICD-10-CM

## 2023-10-05 DIAGNOSIS — K21.9 GASTROESOPHAGEAL REFLUX DISEASE WITHOUT ESOPHAGITIS: ICD-10-CM

## 2023-10-05 DIAGNOSIS — F51.01 PRIMARY INSOMNIA: ICD-10-CM

## 2023-10-05 DIAGNOSIS — M79.605 LEG PAIN, BILATERAL: ICD-10-CM

## 2023-10-05 DIAGNOSIS — F19.20 CONTROLLED DRUG DEPENDENCE (MULTI): ICD-10-CM

## 2023-10-05 DIAGNOSIS — T78.40XS ALLERGY, SEQUELA: ICD-10-CM

## 2023-10-05 DIAGNOSIS — G89.29 OTHER CHRONIC PAIN: ICD-10-CM

## 2023-10-05 DIAGNOSIS — R11.2 NAUSEA AND VOMITING, UNSPECIFIED VOMITING TYPE: ICD-10-CM

## 2023-10-05 DIAGNOSIS — E11.43 DIABETIC AUTONOMIC NEUROPATHY ASSOCIATED WITH TYPE 2 DIABETES MELLITUS (MULTI): ICD-10-CM

## 2023-10-05 DIAGNOSIS — I10 BENIGN ESSENTIAL HYPERTENSION: ICD-10-CM

## 2023-10-05 DIAGNOSIS — M79.604 LEG PAIN, BILATERAL: ICD-10-CM

## 2023-10-05 DIAGNOSIS — S06.5XAS: ICD-10-CM

## 2023-10-05 DIAGNOSIS — R79.89 LOW SERUM TESTOSTERONE LEVEL: ICD-10-CM

## 2023-10-05 DIAGNOSIS — F41.9 ANXIETY: ICD-10-CM

## 2023-10-05 DIAGNOSIS — G60.3 IDIOPATHIC PROGRESSIVE NEUROPATHY: ICD-10-CM

## 2023-10-05 DIAGNOSIS — M54.17 LUMBOSACRAL RADICULOPATHY: ICD-10-CM

## 2023-10-05 PROCEDURE — 3079F DIAST BP 80-89 MM HG: CPT | Performed by: FAMILY MEDICINE

## 2023-10-05 PROCEDURE — 1036F TOBACCO NON-USER: CPT | Performed by: FAMILY MEDICINE

## 2023-10-05 PROCEDURE — 3075F SYST BP GE 130 - 139MM HG: CPT | Performed by: FAMILY MEDICINE

## 2023-10-05 PROCEDURE — 1160F RVW MEDS BY RX/DR IN RCRD: CPT | Performed by: FAMILY MEDICINE

## 2023-10-05 PROCEDURE — 1126F AMNT PAIN NOTED NONE PRSNT: CPT | Performed by: FAMILY MEDICINE

## 2023-10-05 PROCEDURE — 1159F MED LIST DOCD IN RCRD: CPT | Performed by: FAMILY MEDICINE

## 2023-10-05 PROCEDURE — 99213 OFFICE O/P EST LOW 20 MIN: CPT | Performed by: FAMILY MEDICINE

## 2023-10-05 RX ORDER — HYDROCODONE BITARTRATE AND ACETAMINOPHEN 7.5; 325 MG/1; MG/1
1 TABLET ORAL EVERY 6 HOURS PRN
Qty: 20 TABLET | Refills: 0 | Status: SHIPPED | OUTPATIENT
Start: 2023-10-05 | End: 2023-10-12

## 2023-10-05 RX ORDER — ONDANSETRON 4 MG/1
4 TABLET, ORALLY DISINTEGRATING ORAL EVERY 6 HOURS PRN
Qty: 90 TABLET | Refills: 1 | Status: SHIPPED | OUTPATIENT
Start: 2023-10-05 | End: 2024-04-23 | Stop reason: SDUPTHER

## 2023-10-05 ASSESSMENT — ENCOUNTER SYMPTOMS
DIARRHEA: 0
ABDOMINAL PAIN: 0
WEAKNESS: 0
CONFUSION: 0
APPETITE CHANGE: 0
HALLUCINATIONS: 0
ACTIVITY CHANGE: 0
NUMBNESS: 1
HYPERACTIVE: 0
CONSTIPATION: 0
DYSPHORIC MOOD: 0
TREMORS: 0
DIZZINESS: 0
FATIGUE: 0
VOMITING: 0
BACK PAIN: 1
PALPITATIONS: 0
NERVOUS/ANXIOUS: 0
CHEST TIGHTNESS: 0
NAUSEA: 0
SLEEP DISTURBANCE: 0
SHORTNESS OF BREATH: 0
COUGH: 0

## 2023-10-05 NOTE — ASSESSMENT & PLAN NOTE
Continue with testosterone replacement, check CBC, CMP and testosterone level at follow-up, PSA testing done 3 months ago was normal.

## 2023-10-05 NOTE — ASSESSMENT & PLAN NOTE
Blood pressure doing good, will check electrolyte and renal functions at next office visit.  Swelling has improved off of amlodipine

## 2023-10-05 NOTE — PROGRESS NOTES
Subjective   Patient ID: Corey Freedman is a 78 y.o. male who presents for 3 MO CSA/UDS.    HPI   No headache, chest pain, shortness of breath, dizziness, lightheadedness, or edema  Seen neurology in September, to see neurosurgery tomorrow for leg weakness and pain  Swelling better, stays active, leg pain and weakness with activity and walking  No falls, some issues with balance, does home exercise to improve balance  Memory stable, some brain fog at times  Emotionally doing OK, uses Buspar as needed, sleeping well at night  Some muscle spasms and twitching (laure in arms/triceps)    OARRS:  Mahendra Rossi MD on 10/5/2023 11:11 AM  I have personally reviewed the OARRS report for Corey Freedman. I have considered the risks of abuse, dependence, addiction and diversion    Is the patient prescribed a combination of a benzodiazepine and opioid?  No    Last Urine Drug Screen / ordered today: No  Recent Results (from the past 8760 hour(s))   Drug Screen, Urine With Reflex to Confirmation    Collection Time: 07/06/23  3:50 PM   Result Value Ref Range    DRUG SCREEN COMMENT URINE SEE BELOW     Amphetamine Screen, Urine PRESUMPTIVE NEGATIVE NEGATIVE    Barbiturate Screen, Urine PRESUMPTIVE NEGATIVE NEGATIVE    BENZODIAZEPINE (PRESENCE) IN URINE BY SCREEN METHOD PRESUMPTIVE NEGATIVE NEGATIVE    Cannabinoid Screen, Urine PRESUMPTIVE NEGATIVE NEGATIVE    Cocaine Screen, Urine PRESUMPTIVE NEGATIVE NEGATIVE    Fentanyl, Ur PRESUMPTIVE NEGATIVE NEGATIVE    Methadone Screen, Urine PRESUMPTIVE NEGATIVE NEGATIVE    Opiate Screen, Urine PRESUMPTIVE NEGATIVE NEGATIVE    Oxycodone Screen, Ur PRESUMPTIVE NEGATIVE NEGATIVE    PCP Screen, Urine PRESUMPTIVE NEGATIVE NEGATIVE     Results are as expected.         Controlled Substance Agreement:  Date of the Last Agreement: 7/6/23  Reviewed Controlled Substance Agreement including but not limited to the benefits, risks, and alternatives to treatment with a Controlled Substance  medication(s).    Testosterone:  What is the patient's goal of therapy? To replace testosterone  Is this being achieved with current treatment? yes    I attest the patient does not have: Polycythemia    Last Testosterone check:  Testosterone, Free   Date Value Ref Range Status   06/29/2023 46.6 30.0 - 135.0 pg/mL Final     Comment:     This test was developed and its analytical performance  characteristics have been determined by [facility] [place] [place]. It has  not been cleared or approved by the U.S. Food and Drug  Administration. This assay has been validated pursuant  to the CLIA regulations and is used for clinical  purposes.     Testosterone   Date Value Ref Range Status   10/10/2019 717 240 - 1,000 ng/dL Final     Comment:      Nandrolone decanoate, 11 Beta-hydroxytestosterone, and    11-keto-testosterone strongly cross react with this test    method.    Patients receiving more than 5 mg/day of biotin may have interference   in test results.  A sample should be taken no sooner than eight hours   after previous dose. Contact the testing laboratory for additional   information.       Testosterone, Total, LC-MS/MS   Date Value Ref Range Status   06/29/2023 291 250 - 1100 ng/dL Final     Comment:     Men with clinically significant hypogonadal  symptoms and testosterone values repeatedly in  the range of the 200-300 ng/dL or less, may  benefit from testosterone treatment after  adequate risk and benefits counseling.            For additional information, please refer to  http://education.Recurve/faq/  [email or web address]  (This link is being provided for informational/  educational purposes only.)     This test was developed and its analytical performance  characteristics have been determined by [facility] [place]. It has  not been cleared or approved by the U.S. Food and Drug  Administration. This assay has been validated  "pursuant  to the CLIA regulations and is used for clinical  purposes.       Last CBC:    No results found for: \"CBCDIF\", \"BMCBC\", \"PR1\"    Last PSA:   Prostate Specific Antigen,Screen   Date Value Ref Range Status   06/29/2023 1.17 0.00 - 4.00 ng/mL Final     Comment:     The FDA requires that the method used for PSA assay be   reported to the physician. Values obtained with different   assay methods must not be used interchangeably. This test  was performed at [facility] using the Access   Hybritech PSA assay is a two-site immunoenzymatic sandwich   assay. The assay is approved for measurement of   prostate-specific antigen (PSA)in serum and may be used   in conjunction with a digital rectal examination in men   50 years and older as an aid in detection of prostate   cancer.  5-Alpha-reductase inhibitors (e.g. Proscar, Finasteride,   Avodart, Dutasteride and Kia) for the treatment of BPH   have been shown to lower PSA levels by an average of 50%   after 6 months of treatment.       Activities of Daily Living:   Is your overall impression that this patient is benefiting (symptom reduction outweighs side effects) from testosterone therapy? Yes     1. Physical Functioning: Better  2. Family Relationship: Same  3. Social Relationship: Same  4. Mood: Same  5. Sleep Patterns: Same  6. Overall Function: Better      Review of Systems   Constitutional:  Negative for activity change, appetite change and fatigue.   Respiratory:  Negative for cough, chest tightness and shortness of breath.    Cardiovascular:  Negative for chest pain, palpitations and leg swelling.   Gastrointestinal:  Negative for abdominal pain, constipation, diarrhea, nausea and vomiting.   Musculoskeletal:  Positive for back pain and gait problem.   Neurological:  Positive for numbness. Negative for dizziness, tremors and weakness.   Psychiatric/Behavioral:  Negative for behavioral problems, confusion, dysphoric mood, hallucinations and " "sleep disturbance. The patient is not nervous/anxious and is not hyperactive.        Objective   /80   Pulse 75   Ht 1.727 m (5' 8\")   Wt 71.2 kg (156 lb 14.4 oz)   SpO2 95%   BMI 23.86 kg/m²     Physical Exam  Vitals and nursing note reviewed.   Constitutional:       Appearance: Normal appearance.   HENT:      Head: Normocephalic and atraumatic.      Right Ear: Tympanic membrane, ear canal and external ear normal.      Left Ear: Tympanic membrane, ear canal and external ear normal.      Nose: Nose normal.      Mouth/Throat:      Mouth: Mucous membranes are moist.      Pharynx: Oropharynx is clear.   Cardiovascular:      Rate and Rhythm: Normal rate and regular rhythm.      Pulses: Normal pulses.      Heart sounds: Normal heart sounds.   Pulmonary:      Effort: Pulmonary effort is normal.      Breath sounds: Normal breath sounds.   Musculoskeletal:      Cervical back: Normal range of motion and neck supple.   Neurological:      Mental Status: He is alert.   Psychiatric:         Mood and Affect: Mood normal.         Behavior: Behavior normal.         Assessment/Plan   Problem List Items Addressed This Visit             ICD-10-CM    Allergies T78.40XA    Relevant Orders    Follow Up In Primary Care - Established    Anxiety F41.9     Currently stable no change.         Relevant Orders    Follow Up In Primary Care - Established    Benign essential hypertension I10     Blood pressure doing good, will check electrolyte and renal functions at next office visit.  Swelling has improved off of amlodipine         Relevant Orders    Follow Up In Primary Care - Established    Comprehensive Metabolic Panel    Lumbosacral radiculopathy M54.17     Give small prescription for pain medication, and see neurosurgery in the next month.         Relevant Medications    HYDROcodone-acetaminophen (Norco) 7.5-325 mg tablet    Other Relevant Orders    Follow Up In Primary Care - Established    Chronic pain G89.29    Relevant " Medications    HYDROcodone-acetaminophen (Norco) 7.5-325 mg tablet    Other Relevant Orders    Follow Up In Primary Care - Established    GERD (gastroesophageal reflux disease) K21.9     Currently stable no change.         Relevant Orders    Follow Up In Primary Care - Established    Hypogonadism in male E29.1     Continue with testosterone replacement, check CBC, CMP and testosterone level at follow-up, PSA testing done 3 months ago was normal.         Relevant Orders    Follow Up In Primary Care - Established    CBC and Auto Differential    Comprehensive Metabolic Panel    Testosterone, total and free    Insomnia G47.00    Relevant Orders    Follow Up In Primary Care - Established    RESOLVED: Low serum testosterone level R79.89    Relevant Orders    Follow Up In Primary Care - Established    CBC and Auto Differential    Comprehensive Metabolic Panel    Testosterone, total and free    Peripheral neuropathy G62.9    Relevant Orders    Follow Up In Primary Care - Established    CBC and Auto Differential    Comprehensive Metabolic Panel    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)     F19.20    Relevant Orders    Follow Up In Primary Care - Established    Nausea and vomiting, unspecified vomiting type     R11.2    Relevant Medications    ondansetron ODT (Zofran-ODT) 4 mg disintegrating tablet

## 2023-10-06 ENCOUNTER — APPOINTMENT (OUTPATIENT)
Dept: PRIMARY CARE | Facility: CLINIC | Age: 78
End: 2023-10-06
Payer: MEDICARE

## 2023-10-23 DIAGNOSIS — G89.29 OTHER CHRONIC PAIN: ICD-10-CM

## 2023-10-23 DIAGNOSIS — M54.12 CERVICAL RADICULOPATHY: ICD-10-CM

## 2023-10-23 DIAGNOSIS — M54.17 LUMBOSACRAL RADICULOPATHY: Primary | ICD-10-CM

## 2023-10-23 RX ORDER — PREGABALIN 75 MG/1
75 CAPSULE ORAL 2 TIMES DAILY
Qty: 180 CAPSULE | Refills: 0 | Status: SHIPPED | OUTPATIENT
Start: 2023-10-23 | End: 2024-01-22 | Stop reason: SDUPTHER

## 2023-10-23 RX ORDER — HYDROCODONE BITARTRATE AND ACETAMINOPHEN 7.5; 325 MG/1; MG/1
1 TABLET ORAL EVERY 6 HOURS PRN
Qty: 30 TABLET | Refills: 0 | Status: SHIPPED | OUTPATIENT
Start: 2023-10-23 | End: 2023-10-30

## 2023-10-23 RX ORDER — HYDROCODONE BITARTRATE AND ACETAMINOPHEN 7.5; 325 MG/1; MG/1
1 TABLET ORAL EVERY 6 HOURS PRN
COMMUNITY
End: 2023-10-23 | Stop reason: SDUPTHER

## 2023-11-06 ENCOUNTER — OFFICE VISIT (OUTPATIENT)
Dept: PRIMARY CARE | Facility: CLINIC | Age: 78
End: 2023-11-06
Payer: MEDICARE

## 2023-11-06 ENCOUNTER — LAB (OUTPATIENT)
Dept: LAB | Facility: LAB | Age: 78
End: 2023-11-06
Payer: MEDICARE

## 2023-11-06 VITALS
OXYGEN SATURATION: 95 % | SYSTOLIC BLOOD PRESSURE: 130 MMHG | BODY MASS INDEX: 24.11 KG/M2 | WEIGHT: 159.1 LBS | HEART RATE: 65 BPM | DIASTOLIC BLOOD PRESSURE: 60 MMHG | HEIGHT: 68 IN

## 2023-11-06 DIAGNOSIS — I10 BENIGN ESSENTIAL HYPERTENSION: ICD-10-CM

## 2023-11-06 DIAGNOSIS — F41.9 ANXIETY: ICD-10-CM

## 2023-11-06 DIAGNOSIS — I49.3 PVC (PREMATURE VENTRICULAR CONTRACTION): ICD-10-CM

## 2023-11-06 DIAGNOSIS — G89.29 OTHER CHRONIC PAIN: ICD-10-CM

## 2023-11-06 DIAGNOSIS — Z01.818 PREOP EXAMINATION: ICD-10-CM

## 2023-11-06 DIAGNOSIS — Z01.818 PREOP TESTING: ICD-10-CM

## 2023-11-06 DIAGNOSIS — I10 BENIGN ESSENTIAL HYPERTENSION: Primary | ICD-10-CM

## 2023-11-06 DIAGNOSIS — M19.19 POST-TRAUMATIC OSTEOARTHRITIS, OTHER SPECIFIED SITE: ICD-10-CM

## 2023-11-06 DIAGNOSIS — M25.69 STIFFNESS OF OTHER SPECIFIED JOINT, NOT ELSEWHERE CLASSIFIED: ICD-10-CM

## 2023-11-06 DIAGNOSIS — M54.17 LUMBOSACRAL RADICULOPATHY: ICD-10-CM

## 2023-11-06 DIAGNOSIS — M54.12 CERVICAL RADICULOPATHY: ICD-10-CM

## 2023-11-06 LAB
ANION GAP SERPL CALC-SCNC: 10 MMOL/L (ref 10–20)
APTT PPP: 35 SECONDS (ref 27–38)
BASOPHILS # BLD AUTO: 0.02 X10*3/UL (ref 0–0.1)
BASOPHILS NFR BLD AUTO: 0.3 %
BUN SERPL-MCNC: 22 MG/DL (ref 6–23)
CALCIUM SERPL-MCNC: 9.5 MG/DL (ref 8.6–10.3)
CHLORIDE SERPL-SCNC: 104 MMOL/L (ref 98–107)
CO2 SERPL-SCNC: 30 MMOL/L (ref 21–32)
CREAT SERPL-MCNC: 1 MG/DL (ref 0.5–1.3)
EOSINOPHIL # BLD AUTO: 0.12 X10*3/UL (ref 0–0.4)
EOSINOPHIL NFR BLD AUTO: 2.1 %
ERYTHROCYTE [DISTWIDTH] IN BLOOD BY AUTOMATED COUNT: 15 % (ref 11.5–14.5)
GFR SERPL CREATININE-BSD FRML MDRD: 76 ML/MIN/1.73M*2
GLUCOSE SERPL-MCNC: 90 MG/DL (ref 74–99)
HCT VFR BLD AUTO: 50.7 % (ref 41–52)
HGB BLD-MCNC: 16.1 G/DL (ref 13.5–17.5)
IMM GRANULOCYTES # BLD AUTO: 0.01 X10*3/UL (ref 0–0.5)
IMM GRANULOCYTES NFR BLD AUTO: 0.2 % (ref 0–0.9)
INR PPP: 1 (ref 0.9–1.1)
LYMPHOCYTES # BLD AUTO: 1.3 X10*3/UL (ref 0.8–3)
LYMPHOCYTES NFR BLD AUTO: 22.6 %
MCH RBC QN AUTO: 30.5 PG (ref 26–34)
MCHC RBC AUTO-ENTMCNC: 31.8 G/DL (ref 32–36)
MCV RBC AUTO: 96 FL (ref 80–100)
MONOCYTES # BLD AUTO: 0.65 X10*3/UL (ref 0.05–0.8)
MONOCYTES NFR BLD AUTO: 11.3 %
NEUTROPHILS # BLD AUTO: 3.65 X10*3/UL (ref 1.6–5.5)
NEUTROPHILS NFR BLD AUTO: 63.5 %
NRBC BLD-RTO: 0 /100 WBCS (ref 0–0)
PLATELET # BLD AUTO: 177 X10*3/UL (ref 150–450)
POTASSIUM SERPL-SCNC: 4.3 MMOL/L (ref 3.5–5.3)
PROTHROMBIN TIME: 11.3 SECONDS (ref 9.8–12.8)
RBC # BLD AUTO: 5.28 X10*6/UL (ref 4.5–5.9)
SODIUM SERPL-SCNC: 140 MMOL/L (ref 136–145)
WBC # BLD AUTO: 5.8 X10*3/UL (ref 4.4–11.3)

## 2023-11-06 PROCEDURE — 1159F MED LIST DOCD IN RCRD: CPT | Performed by: FAMILY MEDICINE

## 2023-11-06 PROCEDURE — 3075F SYST BP GE 130 - 139MM HG: CPT | Performed by: FAMILY MEDICINE

## 2023-11-06 PROCEDURE — 93000 ELECTROCARDIOGRAM COMPLETE: CPT | Performed by: FAMILY MEDICINE

## 2023-11-06 PROCEDURE — 99213 OFFICE O/P EST LOW 20 MIN: CPT | Performed by: FAMILY MEDICINE

## 2023-11-06 PROCEDURE — 85025 COMPLETE CBC W/AUTO DIFF WBC: CPT

## 2023-11-06 PROCEDURE — 85610 PROTHROMBIN TIME: CPT

## 2023-11-06 PROCEDURE — 85730 THROMBOPLASTIN TIME PARTIAL: CPT

## 2023-11-06 PROCEDURE — 1160F RVW MEDS BY RX/DR IN RCRD: CPT | Performed by: FAMILY MEDICINE

## 2023-11-06 PROCEDURE — 80048 BASIC METABOLIC PNL TOTAL CA: CPT

## 2023-11-06 PROCEDURE — 36415 COLL VENOUS BLD VENIPUNCTURE: CPT

## 2023-11-06 PROCEDURE — 1126F AMNT PAIN NOTED NONE PRSNT: CPT | Performed by: FAMILY MEDICINE

## 2023-11-06 PROCEDURE — 87081 CULTURE SCREEN ONLY: CPT

## 2023-11-06 PROCEDURE — 1036F TOBACCO NON-USER: CPT | Performed by: FAMILY MEDICINE

## 2023-11-06 PROCEDURE — 3078F DIAST BP <80 MM HG: CPT | Performed by: FAMILY MEDICINE

## 2023-11-06 RX ORDER — HYDROCODONE BITARTRATE AND ACETAMINOPHEN 7.5; 325 MG/1; MG/1
1 TABLET ORAL EVERY 6 HOURS PRN
Qty: 30 TABLET | Refills: 0 | Status: CANCELLED | OUTPATIENT
Start: 2023-11-06 | End: 2023-11-13

## 2023-11-06 ASSESSMENT — ENCOUNTER SYMPTOMS
WHEEZING: 0
PALPITATIONS: 0
CONSTIPATION: 0
ABDOMINAL DISTENTION: 0
ARTHRALGIAS: 0
BACK PAIN: 1
HEADACHES: 0
VOMITING: 0
UNEXPECTED WEIGHT CHANGE: 0
ACTIVITY CHANGE: 0
COUGH: 0
RHINORRHEA: 0
ABDOMINAL PAIN: 0
NAUSEA: 0
SHORTNESS OF BREATH: 0
APPETITE CHANGE: 0
LIGHT-HEADEDNESS: 0
DIZZINESS: 0
ADENOPATHY: 0
NUMBNESS: 0
FATIGUE: 0
DIARRHEA: 0
SLEEP DISTURBANCE: 0
TROUBLE SWALLOWING: 0
DIFFICULTY URINATING: 0

## 2023-11-06 NOTE — PROGRESS NOTES
Subjective   Patient ID: Corey Freedman is a 78 y.o. male who presents for Pre-op Exam.    HPI   Chronic LBP, having more pain,trouble walking, to have surgery 11/29 at [place]  Had 4 JOAN (not much help)  Seen Cardiology 10/11/23, normal evaluation  No headache, chest pain, shortness of breath, dizziness, lightheadedness, or edema  No palpitations, able to exert 4 mets  No skin issues, some nocturia (stable chronically), no dysuria or urgency of urine  No tobacco use/history      Review of Systems   Constitutional:  Negative for activity change, appetite change, fatigue and unexpected weight change.   HENT:  Negative for ear pain, nosebleeds, rhinorrhea, sneezing and trouble swallowing.    Respiratory:  Negative for cough, shortness of breath and wheezing.    Cardiovascular:  Negative for chest pain, palpitations and leg swelling.   Gastrointestinal:  Negative for abdominal distention, abdominal pain, constipation, diarrhea, nausea and vomiting.   Genitourinary:  Negative for difficulty urinating.   Musculoskeletal:  Positive for back pain. Negative for arthralgias.   Skin:  Negative for rash.   Neurological:  Negative for dizziness, light-headedness, numbness and headaches.   Hematological:  Negative for adenopathy.   Psychiatric/Behavioral:  Negative for behavioral problems and sleep disturbance.    All other systems reviewed and are negative.      Current Outpatient Medications:     acetaminophen 500 mg capsule, Tylenol 500 MG CAPS  Refills: 0     Active, Disp: , Rfl:     busPIRone (Buspar) 5 mg tablet, Take 1 tablet (5 mg) by mouth in the morning and 1 tablet (5 mg) in the evening and 1 tablet (5 mg) before bedtime., Disp: 270 tablet, Rfl: 3    hydroCHLOROthiazide (HYDRODiuril) 25 mg tablet, Take 1 tablet (25 mg) by mouth once daily., Disp: 90 tablet, Rfl: 3    losartan (Cozaar) 100 mg tablet, Take 1 tablet (100 mg) by mouth once daily., Disp: 90 tablet, Rfl: 3    metoprolol succinate XL (Toprol-XL) 50 mg  "24 hr tablet, Take 1 tablet (50 mg) by mouth 2 times a day., Disp: 180 tablet, Rfl: 3    montelukast (Singulair) 10 mg tablet, Take 1 tablet (10 mg) by mouth as needed at bedtime (allergy symptoms)., Disp: , Rfl:     multivitamin capsule, Take 1 capsule by mouth once daily., Disp: , Rfl:     mupirocin (Bactroban) 2 % cream, Apply and gently massage into affected area(s) twice daily, Disp: , Rfl:     ondansetron ODT (Zofran-ODT) 4 mg disintegrating tablet, Take 1 tablet (4 mg) by mouth every 6 hours if needed for nausea or vomiting., Disp: 90 tablet, Rfl: 1    pregabalin (Lyrica) 75 mg capsule, Take 1 capsule (75 mg) by mouth 2 times a day., Disp: 180 capsule, Rfl: 0    testosterone 20.25 mg/1.25 gram (1.62 %) gel in metered-dose pump, Place 2 Pump on the skin once daily. APPLY one PUMP PRESSES ONE TIME DAILY AS DIRECTED, Disp: 75 g, Rfl: 5      Objective   /60   Pulse 65   Ht 1.727 m (5' 8\")   Wt 72.2 kg (159 lb 1.6 oz)   SpO2 95%   BMI 24.19 kg/m²     Physical Exam  Vitals and nursing note reviewed.   Constitutional:       General: He is not in acute distress.     Appearance: Normal appearance. He is not toxic-appearing.   HENT:      Head: Normocephalic and atraumatic.      Right Ear: Tympanic membrane, ear canal and external ear normal.      Left Ear: Tympanic membrane, ear canal and external ear normal.      Nose: Nose normal.      Mouth/Throat:      Mouth: Mucous membranes are moist.      Pharynx: Oropharynx is clear.   Eyes:      Extraocular Movements: Extraocular movements intact.      Conjunctiva/sclera: Conjunctivae normal.      Pupils: Pupils are equal, round, and reactive to light.   Cardiovascular:      Rate and Rhythm: Normal rate and regular rhythm.      Pulses: Normal pulses.      Heart sounds: Normal heart sounds.   Pulmonary:      Effort: Pulmonary effort is normal.      Breath sounds: Normal breath sounds.   Abdominal:      General: Abdomen is flat. Bowel sounds are normal.      " Palpations: Abdomen is soft.   Musculoskeletal:      Cervical back: Normal range of motion and neck supple.   Skin:     General: Skin is warm and dry.      Capillary Refill: Capillary refill takes less than 2 seconds.   Neurological:      General: No focal deficit present.      Mental Status: He is alert and oriented to person, place, and time. Mental status is at baseline.   Psychiatric:         Mood and Affect: Mood normal.         Behavior: Behavior normal.       Results from last 7 days   Lab Units 11/06/23  1100   SODIUM mmol/L 140   POTASSIUM mmol/L 4.3   CHLORIDE mmol/L 104   CO2 mmol/L 30   BUN mg/dL 22   CREATININE mg/dL 1.00   CALCIUM mg/dL 9.5   GLUCOSE mg/dL 90      Results from last 7 days   Lab Units 11/06/23  1100   WBC AUTO x10*3/uL 5.8   HEMOGLOBIN g/dL 16.1   HEMATOCRIT % 50.7   PLATELETS AUTO x10*3/uL 177    .l    Assessment/Plan   Problem List Items Addressed This Visit             ICD-10-CM    Anxiety F41.9     Stable no change.         Benign essential hypertension - Primary I10     Blood pressure under good control, no change with medication.         Relevant Orders    ECG 12 Lead (Completed)    Basic Metabolic Panel    Cervical radiculopathy M54.12    Lumbosacral radiculopathy M54.17    Chronic pain G89.29    PVC (premature ventricular contraction) I49.3     Seen cardiologist in the past month, no change with medication, check EKG today.         Relevant Orders    ECG 12 Lead (Completed)     Other Visit Diagnoses         Codes    Preop examination     Z01.818    Benign exam today, check labs per surgery request, EKG stable, medically acceptable for planned surgery.     Relevant Orders    ECG 12 Lead (Completed)    Basic Metabolic Panel    CBC and Auto Differential    Protime-INR    APTT    Staphylococcus aureus/MRSA colonization, Culture    Preop testing     Z01.818    Normal exam today, able to exercise 4 METS, check EKG and labs per surgery request, anticipate normal medical clearance for  surgery.     Relevant Orders    ECG 12 Lead (Completed)    Basic Metabolic Panel    CBC and Auto Differential    Protime-INR    APTT    Staphylococcus aureus/MRSA colonization, Culture    Post-traumatic osteoarthritis, other specified site     M19.19    Relevant Orders    Protime-INR    Stiffness of other specified joint, not elsewhere classified     M25.69    Relevant Orders    APTT

## 2023-11-07 ENCOUNTER — TELEPHONE (OUTPATIENT)
Dept: PRIMARY CARE | Facility: CLINIC | Age: 78
End: 2023-11-07
Payer: MEDICARE

## 2023-11-07 NOTE — TELEPHONE ENCOUNTER
----- Message from Mahendra Rossi MD sent at 11/6/2023  5:55 PM EST -----  Please send a copy of the office note from 11 6 along with EKG and copies of blood test to the patient's surgeon.

## 2023-11-09 DIAGNOSIS — I10 BENIGN ESSENTIAL HYPERTENSION: Primary | ICD-10-CM

## 2023-11-09 LAB — STAPHYLOCOCCUS SPEC CULT: NORMAL

## 2023-11-09 RX ORDER — LOSARTAN POTASSIUM AND HYDROCHLOROTHIAZIDE 25; 100 MG/1; MG/1
1 TABLET ORAL DAILY
COMMUNITY
End: 2023-11-09 | Stop reason: SDUPTHER

## 2023-11-09 RX ORDER — LOSARTAN POTASSIUM AND HYDROCHLOROTHIAZIDE 25; 100 MG/1; MG/1
1 TABLET ORAL DAILY
Qty: 90 TABLET | Refills: 3 | Status: SHIPPED | OUTPATIENT
Start: 2023-11-09 | End: 2024-11-08

## 2023-11-10 ENCOUNTER — TELEPHONE (OUTPATIENT)
Dept: PRIMARY CARE | Facility: CLINIC | Age: 78
End: 2023-11-10
Payer: MEDICARE

## 2023-11-10 DIAGNOSIS — G89.4 CHRONIC PAIN SYNDROME: ICD-10-CM

## 2023-11-10 DIAGNOSIS — M54.17 LUMBOSACRAL RADICULOPATHY: Primary | ICD-10-CM

## 2023-11-10 NOTE — TELEPHONE ENCOUNTER
----- Message from Mahendra Rossi MD sent at 11/9/2023 10:42 AM EST -----  Please forward results to surgery.

## 2023-11-10 NOTE — TELEPHONE ENCOUNTER
I believe that his EKG is normal.  I do not interpret any evidence of prior cardiac disease or injury to the heart on his EKG, this is a computer reading that is offering suggestions.

## 2023-11-10 NOTE — TELEPHONE ENCOUNTER
[facility] HE IS ASKING IF THE ABNORMALITIES (INFERIOR WALL MI?) WOULD PREVENT HIM FROM PROCEEDING WITH SURGERY. HIS IS ANXIOUS ABOUT ANY BARRIERS THAT MAY PREVENT SURGERY.

## 2023-11-10 NOTE — TELEPHONE ENCOUNTER
I believe that his EKG is normal.  I do not interpret any evidence of prior cardiac disease or injury to the heart on his EKG, this is a computer reading that is offering suggestions.         Note        You routed conversation to Mahendra Rossi MD4 hours ago (9:20 AM)     You4 hours ago (9:20 AM)       PATIENT REVIEWED HIS EKG ON [facility]. HE IS ASKING IF THE ABNORMALITIES (INFERIOR WALL MI?) WOULD PREVENT HIM FROM PROCEEDING WITH SURGERY. HIS IS ANXIOUS ABOUT ANY BARRIERS THAT MAY PREVENT SURGERY.           PATIENT NOTIFIED. VERBALIZED UNDERSTANDING.

## 2023-11-10 NOTE — TELEPHONE ENCOUNTER
He just received 30 tablets on 23 October.  He should be okay, he also had had a prescription earlier in October as well.

## 2023-11-13 RX ORDER — HYDROCODONE BITARTRATE AND ACETAMINOPHEN 7.5; 325 MG/1; MG/1
1 TABLET ORAL EVERY 6 HOURS PRN
Qty: 60 TABLET | Refills: 0 | Status: SHIPPED | OUTPATIENT
Start: 2023-11-13 | End: 2023-11-28

## 2023-12-12 ENCOUNTER — OFFICE VISIT (OUTPATIENT)
Dept: PRIMARY CARE | Facility: CLINIC | Age: 78
End: 2023-12-12
Payer: MEDICARE

## 2023-12-12 VITALS
BODY MASS INDEX: 23.43 KG/M2 | WEIGHT: 154.6 LBS | HEIGHT: 68 IN | OXYGEN SATURATION: 96 % | HEART RATE: 80 BPM | SYSTOLIC BLOOD PRESSURE: 120 MMHG | DIASTOLIC BLOOD PRESSURE: 54 MMHG

## 2023-12-12 DIAGNOSIS — M54.17 LUMBOSACRAL RADICULOPATHY: ICD-10-CM

## 2023-12-12 DIAGNOSIS — Z48.02 ENCOUNTER FOR STAPLE REMOVAL: Primary | ICD-10-CM

## 2023-12-12 PROCEDURE — 1036F TOBACCO NON-USER: CPT | Performed by: FAMILY MEDICINE

## 2023-12-12 PROCEDURE — 3078F DIAST BP <80 MM HG: CPT | Performed by: FAMILY MEDICINE

## 2023-12-12 PROCEDURE — 1160F RVW MEDS BY RX/DR IN RCRD: CPT | Performed by: FAMILY MEDICINE

## 2023-12-12 PROCEDURE — 3074F SYST BP LT 130 MM HG: CPT | Performed by: FAMILY MEDICINE

## 2023-12-12 PROCEDURE — 99212 OFFICE O/P EST SF 10 MIN: CPT | Performed by: FAMILY MEDICINE

## 2023-12-12 PROCEDURE — 1159F MED LIST DOCD IN RCRD: CPT | Performed by: FAMILY MEDICINE

## 2023-12-12 PROCEDURE — 1126F AMNT PAIN NOTED NONE PRSNT: CPT | Performed by: FAMILY MEDICINE

## 2023-12-12 RX ORDER — CYCLOBENZAPRINE HCL 5 MG
5 TABLET ORAL 3 TIMES DAILY PRN
COMMUNITY
Start: 2023-12-03 | End: 2024-01-22 | Stop reason: WASHOUT

## 2023-12-12 RX ORDER — HYDROCODONE BITARTRATE AND ACETAMINOPHEN 7.5; 325 MG/1; MG/1
1 TABLET ORAL EVERY 6 HOURS PRN
COMMUNITY
Start: 2023-12-10 | End: 2024-04-23 | Stop reason: WASHOUT

## 2023-12-12 ASSESSMENT — ENCOUNTER SYMPTOMS
CHILLS: 0
FATIGUE: 0
FEVER: 0
BACK PAIN: 1

## 2023-12-12 NOTE — PROGRESS NOTES
"Subjective   Patient ID: Corey Freedman is a 78 y.o. male who presents for S/P Neck Surgery (Remove sutures).    HPI   Had surgery 11/29 (lamenectomey and facetectomy)    Review of Systems   Constitutional:  Negative for chills, fatigue and fever.   Musculoskeletal:  Positive for back pain and gait problem.   Skin:  Negative for rash.       Objective   /54   Pulse 80   Ht 1.727 m (5' 8\")   Wt 70.1 kg (154 lb 9.6 oz)   SpO2 96%   BMI 23.51 kg/m²     Physical Exam  Vitals and nursing note reviewed.   Constitutional:       Appearance: Normal appearance. He is normal weight.   Skin:     Comments: Staples easily removed from vertical incision over the lumbar spine, wound looks good, good granulation, no discharge, no bleeding and no dehiscence.   Neurological:      Mental Status: He is alert.         Assessment/Plan   Problem List Items Addressed This Visit             ICD-10-CM    Lumbosacral radiculopathy M54.17     Other Visit Diagnoses         Codes    Encounter for staple removal    -  Primary Z48.02    Wound well-approximated no dehiscence no evidence of infection Staples easily removed.                "

## 2023-12-18 ENCOUNTER — APPOINTMENT (OUTPATIENT)
Dept: PRIMARY CARE | Facility: CLINIC | Age: 78
End: 2023-12-18
Payer: MEDICARE

## 2024-01-22 ENCOUNTER — OFFICE VISIT (OUTPATIENT)
Dept: PRIMARY CARE | Facility: CLINIC | Age: 79
End: 2024-01-22
Payer: MEDICARE

## 2024-01-22 ENCOUNTER — LAB (OUTPATIENT)
Dept: LAB | Facility: LAB | Age: 79
End: 2024-01-22
Payer: MEDICARE

## 2024-01-22 VITALS
SYSTOLIC BLOOD PRESSURE: 140 MMHG | BODY MASS INDEX: 24.38 KG/M2 | HEIGHT: 68 IN | OXYGEN SATURATION: 96 % | WEIGHT: 160.9 LBS | DIASTOLIC BLOOD PRESSURE: 70 MMHG | HEART RATE: 68 BPM

## 2024-01-22 DIAGNOSIS — G89.29 OTHER CHRONIC PAIN: ICD-10-CM

## 2024-01-22 DIAGNOSIS — R79.89 LOW SERUM TESTOSTERONE LEVEL: ICD-10-CM

## 2024-01-22 DIAGNOSIS — F51.01 PRIMARY INSOMNIA: ICD-10-CM

## 2024-01-22 DIAGNOSIS — I10 BENIGN ESSENTIAL HYPERTENSION: ICD-10-CM

## 2024-01-22 DIAGNOSIS — M54.17 LUMBOSACRAL RADICULOPATHY: ICD-10-CM

## 2024-01-22 DIAGNOSIS — Z00.00 ROUTINE GENERAL MEDICAL EXAMINATION AT HEALTH CARE FACILITY: Primary | ICD-10-CM

## 2024-01-22 DIAGNOSIS — F41.9 ANXIETY: ICD-10-CM

## 2024-01-22 DIAGNOSIS — M79.605 LEG PAIN, BILATERAL: ICD-10-CM

## 2024-01-22 DIAGNOSIS — F19.20 CONTROLLED DRUG DEPENDENCE (MULTI): ICD-10-CM

## 2024-01-22 DIAGNOSIS — K21.9 GASTROESOPHAGEAL REFLUX DISEASE WITHOUT ESOPHAGITIS: ICD-10-CM

## 2024-01-22 DIAGNOSIS — E29.1 HYPOGONADISM IN MALE: ICD-10-CM

## 2024-01-22 DIAGNOSIS — S06.5XAS: ICD-10-CM

## 2024-01-22 DIAGNOSIS — M79.604 LEG PAIN, BILATERAL: ICD-10-CM

## 2024-01-22 DIAGNOSIS — G60.3 IDIOPATHIC PROGRESSIVE NEUROPATHY: ICD-10-CM

## 2024-01-22 DIAGNOSIS — T78.40XS ALLERGY, SEQUELA: ICD-10-CM

## 2024-01-22 PROBLEM — S06.5XAA POST-TRAUMATIC SUBDURAL HEMATOMA (MULTI): Status: RESOLVED | Noted: 2023-02-04 | Resolved: 2024-01-22

## 2024-01-22 LAB
ALBUMIN SERPL BCP-MCNC: 4.8 G/DL (ref 3.4–5)
ALP SERPL-CCNC: 76 U/L (ref 33–136)
ALT SERPL W P-5'-P-CCNC: 14 U/L (ref 10–52)
ANION GAP SERPL CALC-SCNC: 13 MMOL/L (ref 10–20)
AST SERPL W P-5'-P-CCNC: 26 U/L (ref 9–39)
BASOPHILS # BLD AUTO: 0.02 X10*3/UL (ref 0–0.1)
BASOPHILS NFR BLD AUTO: 0.3 %
BILIRUB SERPL-MCNC: 0.5 MG/DL (ref 0–1.2)
BUN SERPL-MCNC: 25 MG/DL (ref 6–23)
CALCIUM SERPL-MCNC: 9.8 MG/DL (ref 8.6–10.3)
CHLORIDE SERPL-SCNC: 103 MMOL/L (ref 98–107)
CO2 SERPL-SCNC: 30 MMOL/L (ref 21–32)
CREAT SERPL-MCNC: 1 MG/DL (ref 0.5–1.3)
EGFRCR SERPLBLD CKD-EPI 2021: 76 ML/MIN/1.73M*2
EOSINOPHIL # BLD AUTO: 0.15 X10*3/UL (ref 0–0.4)
EOSINOPHIL NFR BLD AUTO: 2.6 %
ERYTHROCYTE [DISTWIDTH] IN BLOOD BY AUTOMATED COUNT: 13.7 % (ref 11.5–14.5)
GLUCOSE SERPL-MCNC: 73 MG/DL (ref 74–99)
HCT VFR BLD AUTO: 42.9 % (ref 41–52)
HGB BLD-MCNC: 13.8 G/DL (ref 13.5–17.5)
IMM GRANULOCYTES # BLD AUTO: 0.01 X10*3/UL (ref 0–0.5)
IMM GRANULOCYTES NFR BLD AUTO: 0.2 % (ref 0–0.9)
LYMPHOCYTES # BLD AUTO: 1.53 X10*3/UL (ref 0.8–3)
LYMPHOCYTES NFR BLD AUTO: 26.1 %
MCH RBC QN AUTO: 30.7 PG (ref 26–34)
MCHC RBC AUTO-ENTMCNC: 32.2 G/DL (ref 32–36)
MCV RBC AUTO: 95 FL (ref 80–100)
MONOCYTES # BLD AUTO: 0.69 X10*3/UL (ref 0.05–0.8)
MONOCYTES NFR BLD AUTO: 11.8 %
NEUTROPHILS # BLD AUTO: 3.47 X10*3/UL (ref 1.6–5.5)
NEUTROPHILS NFR BLD AUTO: 59 %
NRBC BLD-RTO: 0 /100 WBCS (ref 0–0)
PLATELET # BLD AUTO: 207 X10*3/UL (ref 150–450)
POTASSIUM SERPL-SCNC: 4.6 MMOL/L (ref 3.5–5.3)
PROT SERPL-MCNC: 7.1 G/DL (ref 6.4–8.2)
RBC # BLD AUTO: 4.5 X10*6/UL (ref 4.5–5.9)
SODIUM SERPL-SCNC: 141 MMOL/L (ref 136–145)
WBC # BLD AUTO: 5.9 X10*3/UL (ref 4.4–11.3)

## 2024-01-22 PROCEDURE — 36415 COLL VENOUS BLD VENIPUNCTURE: CPT

## 2024-01-22 PROCEDURE — 1159F MED LIST DOCD IN RCRD: CPT | Performed by: FAMILY MEDICINE

## 2024-01-22 PROCEDURE — 1170F FXNL STATUS ASSESSED: CPT | Performed by: FAMILY MEDICINE

## 2024-01-22 PROCEDURE — 84402 ASSAY OF FREE TESTOSTERONE: CPT

## 2024-01-22 PROCEDURE — 80053 COMPREHEN METABOLIC PANEL: CPT

## 2024-01-22 PROCEDURE — 1126F AMNT PAIN NOTED NONE PRSNT: CPT | Performed by: FAMILY MEDICINE

## 2024-01-22 PROCEDURE — 1036F TOBACCO NON-USER: CPT | Performed by: FAMILY MEDICINE

## 2024-01-22 PROCEDURE — G0439 PPPS, SUBSEQ VISIT: HCPCS | Performed by: FAMILY MEDICINE

## 2024-01-22 PROCEDURE — 3078F DIAST BP <80 MM HG: CPT | Performed by: FAMILY MEDICINE

## 2024-01-22 PROCEDURE — 3077F SYST BP >= 140 MM HG: CPT | Performed by: FAMILY MEDICINE

## 2024-01-22 PROCEDURE — 1160F RVW MEDS BY RX/DR IN RCRD: CPT | Performed by: FAMILY MEDICINE

## 2024-01-22 PROCEDURE — 85025 COMPLETE CBC W/AUTO DIFF WBC: CPT

## 2024-01-22 PROCEDURE — 99214 OFFICE O/P EST MOD 30 MIN: CPT | Performed by: FAMILY MEDICINE

## 2024-01-22 RX ORDER — TESTOSTERONE 20.25 MG/1.25G
2 GEL TOPICAL DAILY
Qty: 75 G | Refills: 3 | Status: SHIPPED | OUTPATIENT
Start: 2024-01-22 | End: 2024-04-23

## 2024-01-22 RX ORDER — PREGABALIN 75 MG/1
75 CAPSULE ORAL 2 TIMES DAILY
Qty: 180 CAPSULE | Refills: 3 | Status: SHIPPED | OUTPATIENT
Start: 2024-01-22 | End: 2025-01-16

## 2024-01-22 RX ORDER — BUSPIRONE HYDROCHLORIDE 5 MG/1
5 TABLET ORAL 3 TIMES DAILY
Qty: 270 TABLET | Refills: 3 | Status: SHIPPED | OUTPATIENT
Start: 2024-01-22 | End: 2025-01-21

## 2024-01-22 ASSESSMENT — ENCOUNTER SYMPTOMS
DIZZINESS: 0
SLEEP DISTURBANCE: 0
HEADACHES: 0
ABDOMINAL DISTENTION: 0
RHINORRHEA: 0
OCCASIONAL FEELINGS OF UNSTEADINESS: 0
NAUSEA: 0
FATIGUE: 0
COUGH: 0
NUMBNESS: 0
BACK PAIN: 1
DIFFICULTY URINATING: 0
VOMITING: 0
WHEEZING: 0
DYSPHORIC MOOD: 0
DIARRHEA: 0
ARTHRALGIAS: 0
ACTIVITY CHANGE: 0
NERVOUS/ANXIOUS: 0
DEPRESSION: 0
ABDOMINAL PAIN: 0
UNEXPECTED WEIGHT CHANGE: 0
SHORTNESS OF BREATH: 0
TROUBLE SWALLOWING: 0
CONSTIPATION: 0
APPETITE CHANGE: 0
LIGHT-HEADEDNESS: 0
LOSS OF SENSATION IN FEET: 0
ADENOPATHY: 0
PALPITATIONS: 0

## 2024-01-22 ASSESSMENT — ACTIVITIES OF DAILY LIVING (ADL)
GROCERY_SHOPPING: INDEPENDENT
BATHING: INDEPENDENT
DOING_HOUSEWORK: INDEPENDENT
DRESSING: INDEPENDENT
MANAGING_FINANCES: INDEPENDENT
TAKING_MEDICATION: INDEPENDENT

## 2024-01-22 ASSESSMENT — PATIENT HEALTH QUESTIONNAIRE - PHQ9
SUM OF ALL RESPONSES TO PHQ9 QUESTIONS 1 AND 2: 0
1. LITTLE INTEREST OR PLEASURE IN DOING THINGS: NOT AT ALL
2. FEELING DOWN, DEPRESSED OR HOPELESS: NOT AT ALL

## 2024-01-22 NOTE — PROGRESS NOTES
Subjective   Reason for Visit: Corey Freedman is an 78 y.o. male here for a Medicare Wellness visit.     Past Medical, Surgical, and Family History reviewed and updated in chart.    Reviewed all medications by prescribing practitioner or clinical pharmacist (such as prescriptions, OTCs, herbal therapies and supplements) and documented in the medical record.    HPI  No headache, chest pain, shortness of breath, dizziness, lightheadedness, or edema  Had spine surgery in December, tapering pain medicine after surgery, to see tomorrow, takes pain medicine 2 times a day on average, no constipation and icing  No radicular pain, walking for exercise and balance improved  Uses Buspar at night to help sleep    OARRS:  Mahendra Rossi MD on 1/22/2024 11:07 AM  I have personally reviewed the OARRS report for Corey Freedman. I have considered the risks of abuse, dependence, addiction and diversion    Is the patient prescribed a combination of a benzodiazepine and opioid?  No    Last Urine Drug Screen / ordered today: No  Recent Results (from the past 8760 hour(s))   Drug Screen, Urine With Reflex to Confirmation    Collection Time: 07/06/23  3:50 PM   Result Value Ref Range    DRUG SCREEN COMMENT URINE SEE BELOW     Amphetamine Screen, Urine PRESUMPTIVE NEGATIVE NEGATIVE    Barbiturate Screen, Urine PRESUMPTIVE NEGATIVE NEGATIVE    BENZODIAZEPINE (PRESENCE) IN URINE BY SCREEN METHOD PRESUMPTIVE NEGATIVE NEGATIVE    Cannabinoid Screen, Urine PRESUMPTIVE NEGATIVE NEGATIVE    Cocaine Screen, Urine PRESUMPTIVE NEGATIVE NEGATIVE    Fentanyl, Ur PRESUMPTIVE NEGATIVE NEGATIVE    Methadone Screen, Urine PRESUMPTIVE NEGATIVE NEGATIVE    Opiate Screen, Urine PRESUMPTIVE NEGATIVE NEGATIVE    Oxycodone Screen, Ur PRESUMPTIVE NEGATIVE NEGATIVE    PCP Screen, Urine PRESUMPTIVE NEGATIVE NEGATIVE     Results are as expected.         Controlled Substance Agreement:  Date of the Last Agreement: 7/6/23  Reviewed Controlled Substance  Agreement including but not limited to the benefits, risks, and alternatives to treatment with a Controlled Substance medication(s).    Opioids:  What is the patient's goal of therapy? To help pain  Is this being achieved with current treatment? yes    I have calculated the patient's Morphine Dose Equivalent (MED):   I have considered referral to Pain Management and/or a specialist, and do not feel it is necessary at this time.    I feel that it is clinically indicated to continue this current medication regimen after consideration of alternative therapies, and other non-opioid treatment.    Opioid Risk Screening:  Family History of Substance Abuse  Alcohol: 0 (7/6/2023  2:00 PM)  Illegal Drugs: 0 (7/6/2023  2:00 PM)  Prescription Drugs: 0 (7/6/2023  2:00 PM)    Personal History of Substance Abuse  Alcohol: 0 (7/6/2023  2:00 PM)  Illegal Drugs: 0 (7/6/2023  2:00 PM)  Prescription Drugs: 0 (7/6/2023  2:00 PM)    Patient Age (16-45)  Age (16-45): 0 (7/6/2023  2:00 PM)    History of Preadolescent Sexual Abuse  History of Preadolescent Sexual Abuse: .0 (7/6/2023  2:00 PM)    Psychological Disease  Attention Deficit Disorder, Obsessive Compulsive Disorder, Bipolar, Schizophrenia: 0 (7/6/2023  2:00 PM)  Depression: 0 (7/6/2023  2:00 PM)    Total Score  Total Score: 0 (7/6/2023  2:00 PM)    Total Score Risk Category  TOTAL SCORE CATEGORY: Low Risk (0-3) (7/6/2023  2:00 PM)        Pain Assessment:  Analgesia  What was your pain level on average during the past week?: 6  What was your pain level at its worst during the past week?: 10 - Pain as bad as it can be  What percentage of your pain has been relieved during the past week?: 50 %  Is the amount of pain relief you are now obtaining from your current pain relievers enough to make a real difference in your life?: Y  Query to Clinician: Is the patient's pain relief clinically significant?: Yes    Activities of Daily Living  Physical Functioning: Better  Family  "Relationships: Same  Social Relationships: Same  Mood: Same  Sleep Patterns: Same  Overall Functioning: Same    Adverse Events  Is patient experiencing any side effects from current pain relievers?: Y  Nausea: Mild  Vomiting: None  Constipation: None  Itching: None  Mental Cloudiness: None  Sweating: None  Fatigue: None  Drowsiness: Mild  Patient's Overall Severity of Side Effects: Mild      Assessment  Is your overall impression that this patient is benefiting from opioid therapy?: Yes  Specific Analgesic Plan: Continue present regimen        Patient Care Team:  Mahendra Rossi MD as PCP - General  [facility]     Review of Systems   Constitutional:  Negative for activity change, appetite change, fatigue and unexpected weight change.   HENT:  Negative for ear pain, nosebleeds, rhinorrhea, sneezing and trouble swallowing.    Respiratory:  Negative for cough, shortness of breath and wheezing.    Cardiovascular:  Negative for chest pain, palpitations and leg swelling.   Gastrointestinal:  Negative for abdominal distention, abdominal pain, constipation, diarrhea, nausea and vomiting.   Genitourinary:  Negative for difficulty urinating.   Musculoskeletal:  Positive for back pain and gait problem. Negative for arthralgias.   Skin:  Negative for rash.   Neurological:  Negative for dizziness, light-headedness, numbness and headaches.   Hematological:  Negative for adenopathy.   Psychiatric/Behavioral:  Negative for behavioral problems, dysphoric mood and sleep disturbance. The patient is not nervous/anxious.    All other systems reviewed and are negative.      Objective   Vitals:  /70   Pulse 68   Ht 1.727 m (5' 8\")   Wt 73 kg (160 lb 14.4 oz)   SpO2 96%   BMI 24.46 kg/m²       Physical Exam  Vitals and nursing note reviewed.   Constitutional:       Appearance: Normal appearance.   HENT:      Head: Normocephalic and atraumatic.      Right Ear: Tympanic " membrane, ear canal and external ear normal.      Left Ear: Tympanic membrane, ear canal and external ear normal.      Nose: Nose normal.      Mouth/Throat:      Mouth: Mucous membranes are moist.      Pharynx: Oropharynx is clear.   Cardiovascular:      Rate and Rhythm: Normal rate and regular rhythm.      Pulses: Normal pulses.      Heart sounds: Normal heart sounds.   Pulmonary:      Effort: Pulmonary effort is normal.      Breath sounds: Normal breath sounds.   Musculoskeletal:      Cervical back: Normal range of motion and neck supple.   Neurological:      Mental Status: He is alert.   Psychiatric:         Mood and Affect: Mood normal.         Behavior: Behavior normal.         Assessment/Plan   Problem List Items Addressed This Visit       Allergies    Current Assessment & Plan     Currently stable no change.         Relevant Orders    Follow Up In Primary Care - Established    Anxiety    Current Assessment & Plan     Currently stable uses buspirone mostly at night.         Relevant Medications    busPIRone (Buspar) 5 mg tablet    Other Relevant Orders    Follow Up In Primary Care - Established    Benign essential hypertension    Current Assessment & Plan     Blood pressure stable, check blood testing today to check electrolytes and renal functions.         Relevant Orders    Follow Up In Primary Care - Established    Comprehensive Metabolic Panel    Lumbosacral radiculopathy    Current Assessment & Plan     Has follow-up later this week with neurosurgeon, is currently weaning off of pain medication, may benefit from outpatient physical therapy.  Encouraged to be as active as he can         Relevant Orders    Follow Up In Primary Care - Established    Chronic pain    Relevant Medications    pregabalin (Lyrica) 75 mg capsule    Other Relevant Orders    Follow Up In Primary Care - Established    GERD (gastroesophageal reflux disease)    Current Assessment & Plan     Stable.         Relevant Orders    Follow Up  In Primary Care - Established    Hypogonadism in male    Current Assessment & Plan     Check levels today for testosterone, CBC and liver functions.  Controlled medicine agreement will be due in July.         Relevant Orders    Follow Up In Primary Care - Established    CBC and Auto Differential    Comprehensive Metabolic Panel    Testosterone,Free and Total    Insomnia    Relevant Orders    Follow Up In Primary Care - Established    Peripheral neuropathy    Relevant Medications    pregabalin (Lyrica) 75 mg capsule    Other Relevant Orders    Follow Up In Primary Care - Established    RESOLVED: Post-traumatic subdural hematoma (CMS/HCC)    Relevant Orders    Follow Up In Primary Care - Established     Other Visit Diagnoses       Routine general medical examination at health care facility    -  Primary    Relevant Orders    Follow Up In Primary Care - Established    Leg pain, bilateral        Relevant Orders    Follow Up In Primary Care - Established    Low serum testosterone level        Relevant Medications    testosterone 20.25 mg/1.25 gram (1.62 %) gel in metered-dose pump    Other Relevant Orders    Follow Up In Primary Care - Established    CBC and Auto Differential    Comprehensive Metabolic Panel    Testosterone,Free and Total    Controlled drug dependence (CMS/HCC)        Relevant Orders    Follow Up In Primary Care - Established

## 2024-01-22 NOTE — ASSESSMENT & PLAN NOTE
Check levels today for testosterone, CBC and liver functions.  Controlled medicine agreement will be due in July.

## 2024-01-22 NOTE — ASSESSMENT & PLAN NOTE
Has follow-up later this week with neurosurgeon, is currently weaning off of pain medication, may benefit from outpatient physical therapy.  Encouraged to be as active as he can

## 2024-01-26 LAB
TESTOSTERONE FREE (CHAN): 140.9 PG/ML (ref 30–135)
TESTOSTERONE,TOTAL,LC-MS/MS: 1036 NG/DL (ref 250–1100)

## 2024-03-15 ENCOUNTER — EVALUATION (OUTPATIENT)
Dept: PHYSICAL THERAPY | Facility: CLINIC | Age: 79
End: 2024-03-15
Payer: MEDICARE

## 2024-03-15 DIAGNOSIS — M48.062 SPINAL STENOSIS, LUMBAR REGION WITH NEUROGENIC CLAUDICATION: ICD-10-CM

## 2024-03-15 PROCEDURE — 97161 PT EVAL LOW COMPLEX 20 MIN: CPT | Mod: GP

## 2024-03-15 ASSESSMENT — PAIN - FUNCTIONAL ASSESSMENT: PAIN_FUNCTIONAL_ASSESSMENT: 0-10

## 2024-03-15 ASSESSMENT — PAIN SCALES - GENERAL: PAINLEVEL_OUTOF10: 9

## 2024-03-15 ASSESSMENT — ACTIVITIES OF DAILY LIVING (ADL): EFFECT OF PAIN ON DAILY ACTIVITIES: SEE GOALS

## 2024-03-15 NOTE — PROGRESS NOTES
Physical Therapy Evaluation and Treatment      Patient Name: Corey Freedman  MRN: [id number]  Today's Date: 3/15/2024  Time Calculation  Start Time: 1407  Stop Time: 1445  Time Calculation (min): 38 min    PT Evaluation Time Entry  PT Evaluation (Low) Time Entry: 35                             Assessment:  Rehab Prognosis: Good  Barriers to Participation:  (none)  C/C sx: see pain section  GREGORIA:    see pain section  ADL makes worse?:prolonged sitting > prolonged stdg; lying down  ADL makes better?:-------  Testing: S/P lumb fusion with rods done 11/29/23    Physical Findings: Limited: AROM ( trunk )                                                Strength ( core )                                                POC:  Ongoing clinic PT to include: Will continue with core work; extremity flexibility; pool unloading with core work    Other: (copay?-no  ) (fall risk?- no  )   Plan:  Treatment/Interventions: Aquatic therapy, Cryotherapy, Dry needling, Education/ Instruction, Gait training, Hot pack, Manual therapy, Self care/ home management, Therapeutic exercises (IASTM/cupping)  PT Plan: Skilled PT  PT Frequency: 2 times per week  Duration: 4wks  Onset Date: 11/29/23  Certification Period Start Date: 03/15/24  Certification Period End Date: 06/13/24  Rehab Potential: Good    Current Problem:   1. Spinal stenosis, lumbar region with neurogenic claudication  Referral to Physical Therapy          Subjective    General:  General  Reason for Referral: spinal stenosis with neuro claudication  Referred By: Kayla  Past Medical History Relevant to Rehab: S/P lumbar fusion 11/29/23  General Comment: 1/9  Precautions:  Precautions  STEADI Fall Risk Score (The score of 4 or more indicates an increased risk of falling): 0  Precautions Comment: cerv fusion 2009; dizziness; Lymes; heat palp; PVCs; TIA (10yrs post); thor pain; B hip troch bursitis; S/P craniostomy with decomp (2022);    Pain:  Pain Assessment  Pain Assessment:  "0-10  Pain Score: 9 (max sx)  Pain Location: Back  Pain Radiating Towards: B lateral hips;  Pain Onset:  (S/P lumb fusion 11/23)  Effect of Pain on Daily Activities: see goals    Prior Level of Function:  Prior Function Per Pt/Caregiver Report  Vocational: Retired (retired physician)    Objective     Outcome Measures:  Other Measures  Other Outcome Measures: 28% FRANCISCO JAVIER     Treatments: eal only-time    Ongoing clinic PT to include: Will continue with core work; extremity flexibility; pool unloading with core work  OP EDUCATION:  Review ongoing personal HEP    Goals:  Active       PT Problem       PT Goal 1       Start:  03/15/24    Expected End:  06/13/24       1. Independent HEP to allow for 50% reduction in max ADL C/C sx ( 8/10) 2-3wks  2. 0/10 night time sx to allow for uninterrupted sleep x 1wk ( 7/7-meds) 3-4wks  3. Strength increase to allow for improved ADL bending; lifting (from 4-/5 to 4+/5) 3-4wks         PT Goal 2       Start:  03/15/24    Expected End:  06/13/24       1.\"I would like to improve my mobility\".             Lumbar Spine      Lumbar Palpation/Joint Mobility Assessment  Palpation/Joint Mobility Comment: most all spinal motion at the mid thor areas (S/P lumb and cerv fusion)  Lumbar AROM  Lumbar flexion: (60°): 40%  Lumbar extension (25°): 25%  Lumbar sidebend right (25°): 25%  Lumbar sidebend left (25°): 25%    Lumbar Myotomes  Lumbar Myotomes WFL: yes                 "

## 2024-03-22 ENCOUNTER — TREATMENT (OUTPATIENT)
Dept: PHYSICAL THERAPY | Facility: CLINIC | Age: 79
End: 2024-03-22
Payer: MEDICARE

## 2024-03-22 DIAGNOSIS — M48.062 SPINAL STENOSIS, LUMBAR REGION WITH NEUROGENIC CLAUDICATION: ICD-10-CM

## 2024-03-22 PROCEDURE — 97113 AQUATIC THERAPY/EXERCISES: CPT | Mod: GP,CQ | Performed by: PHYSICAL THERAPY ASSISTANT

## 2024-03-22 ASSESSMENT — PAIN SCALES - GENERAL: PAINLEVEL_OUTOF10: 4

## 2024-03-22 ASSESSMENT — ACTIVITIES OF DAILY LIVING (ADL): EFFECT OF PAIN ON DAILY ACTIVITIES: SEE GOALS

## 2024-03-22 ASSESSMENT — PAIN - FUNCTIONAL ASSESSMENT: PAIN_FUNCTIONAL_ASSESSMENT: 0-10

## 2024-03-22 NOTE — PROGRESS NOTES
"Physical Therapy    Physical Therapy Treatment    Patient Name: Corey Freedman  MRN: [id number]  Today's Date: 3/22/2024  Time Calculation  Start Time: 1015  Stop Time: 1100  Time Calculation (min): 45 min  PT Therapeutic Procedures Time Entry  Aquatic Therapy Time Entry: 41                   Current Problem  Problem List Items Addressed This Visit             ICD-10-CM       Neuro    Spinal stenosis, lumbar region with neurogenic claudication M48.062        General  Reason for Referral: spinal stenosis with neuro claudication  Referred By: Kayla  Past Medical History Relevant to Rehab: S/P lumbar fusion 11/29/23  General Comment: 2/9    Subjective    Patient reports no falls and states low back pain of  4/10 low back pain.      Precautions  Precautions  Precautions Comment: cerv fusion 2009; dizziness; Lymes; heat palp; PVCs; TIA (10yrs post); thor pain; B hip troch bursitis; S/P craniostomy with decomp (2022);    Pain  Pain Assessment: 0-10  Pain Score: 4  Pain Location: Back  Pain Radiating Towards: B lateral hips  Effect of Pain on Daily Activities: see goals    Objective   Ongoing clinic PT to include: Will continue with core work; extremity flexibility; pool unloading with core work    Treatments:  SIDE STEPPING x 3 laps  Heel raises x 12  Squats x 12  Hip Flexion x 12  Hip Abd/add x 12  Alt. March Steps x 12  DB ROLLS  [place] x 30\" ea. Dir  ABDOMINAL sm. Blue board x 12 ea.  Flexion/push/pull  UE PADDLES  Open x 20 each   Flex/ext, abd/add, H. Abd/add, push/pull  100% UNLOADING 2 XLG noodles  Bike 5'  Hip Abd/add 5'  Hang 5'  GRADUAL EXIT 3'  OP Education      Assessment  Patient orientated to pool.  Patient has good TrA and keeps intact with there-ex.  Patient has good form/understanding with there-ex, once shown.  Patient needing one UE support with LE there-ex and no support with 100% unloading.  Continue with core stability while performing dyn activity to decrease back pain.  Patient verified by " "name and .    Plan  Continue with core stability to improve sleep, standing, ambulation.  Treatment/Interventions: Aquatic therapy, Cryotherapy, Dry needling, Education/ Instruction, Gait training, Hot pack, Manual therapy, Self care/ home management, Therapeutic exercises (IASTM/cupping)  PT Plan: Skilled PT  PT Frequency: 2 times per week  Duration: 4wks  Onset Date: 23  Certification Period Start Date: 03/15/24  Certification Period End Date: 24  Rehab Potential: Good    Active       PT Problem       PT Goal 1       Start:  03/15/24    Expected End:  24       1. Independent HEP to allow for 50% reduction in max ADL C/C sx ( 8/10) 2-3wks  2. 0/10 night time sx to allow for uninterrupted sleep x 1wk ( 7/7-meds) 3-4wks  3. Strength increase to allow for improved ADL bending; lifting (from 4-/5 to 4+/5) 3-4wks         PT Goal 2       Start:  03/15/24    Expected End:  24       1.\"I would like to improve my mobility\".             "

## 2024-04-03 ENCOUNTER — TREATMENT (OUTPATIENT)
Dept: PHYSICAL THERAPY | Facility: CLINIC | Age: 79
End: 2024-04-03
Payer: MEDICARE

## 2024-04-03 DIAGNOSIS — M48.062 SPINAL STENOSIS, LUMBAR REGION WITH NEUROGENIC CLAUDICATION: ICD-10-CM

## 2024-04-03 PROCEDURE — 97113 AQUATIC THERAPY/EXERCISES: CPT | Mod: GP,CQ | Performed by: PHYSICAL THERAPY ASSISTANT

## 2024-04-03 ASSESSMENT — PAIN - FUNCTIONAL ASSESSMENT: PAIN_FUNCTIONAL_ASSESSMENT: 0-10

## 2024-04-03 ASSESSMENT — PAIN SCALES - GENERAL: PAINLEVEL_OUTOF10: 5 - MODERATE PAIN

## 2024-04-03 ASSESSMENT — ACTIVITIES OF DAILY LIVING (ADL): EFFECT OF PAIN ON DAILY ACTIVITIES: SEE GOALS

## 2024-04-03 NOTE — PROGRESS NOTES
"Physical Therapy    Physical Therapy Treatment    Patient Name: Corey Freedman  MRN: [id number]  Today's Date: 4/3/2024  Time Calculation  Start Time: 1045  Stop Time: 1130  Time Calculation (min): 45 min  PT Therapeutic Procedures Time Entry  Aquatic Therapy Time Entry: 43                   Current Problem  Problem List Items Addressed This Visit             ICD-10-CM       Neuro    Spinal stenosis, lumbar region with neurogenic claudication M48.062        General  Reason for Referral: spinal stenosis with neuro claudication  Referred By: Kayla  Past Medical History Relevant to Rehab: S/P lumbar fusion 11/29/23  General Comment: 3/9    Subjective   Patient reports no falls and states low back pain of 5/10 today.  Patient reports \"felt wonderful for half the day\" post last aquatic therapy.    Precautions  Precautions  Precautions Comment: cerv fusion 2009; dizziness; Lymes; heat palp; PVCs; TIA (10yrs post); thor pain; B hip troch bursitis; S/P craniostomy with decomp (2022);    Pain  Pain Assessment: 0-10  Pain Score: 5 - Moderate pain  Pain Location: Back  Pain Radiating Towards: B lateral hips  Effect of Pain on Daily Activities: see goals    Objective   SIDE STEPPING x 3 laps  Heel raises x 12  Squats x 12  Hip Flexion x 12  Hip Abd/add x 12  Alt. March Steps x 12  DB ROLLS  [place] x 30\" ea. Dir  ABDOMINAL sm. Blue board x 12 ea.  Flexion/push/pull  UE PADDLES  Open x 20 each   Flex/ext, abd/add, H. Abd/add, push/pull  100% UNLOADING 2 XLG noodles  Bike 5'  Hip Abd/add 5'  Hang 5'  GRADUAL EXIT 3'    Treatments:    OP Education      Assessment  Patient tolerated treatment without increased back pain.  Patient has good TrA and keeps body in good alignment.  Patient has good form/understanding with there-ex.  Patient needing one UE support with LE there-ex and no support with 100% unloading.    Continue with core stability while performing dyn activity to decrease back pain.  Patient verified by name and " ".    Plan  Continue with core stgrength to improve sleep, standing, ambulation.  Treatment/Interventions: Aquatic therapy, Cryotherapy, Dry needling, Education/ Instruction, Gait training, Hot pack, Manual therapy, Self care/ home management, Therapeutic exercises (IASTM/cupping)  PT Plan: Skilled PT  PT Frequency: 2 times per week  Duration: 4wks  Onset Date: 23  Certification Period Start Date: 03/15/24  Certification Period End Date: 24  Rehab Potential: Good    Active       PT Problem       PT Goal 1       Start:  03/15/24    Expected End:  24       1. Independent HEP to allow for 50% reduction in max ADL C/C sx ( 8/10) 2-3wks  2. 0/10 night time sx to allow for uninterrupted sleep x 1wk ( 7/7-meds) 3-4wks  3. Strength increase to allow for improved ADL bending; lifting (from 4-/5 to 4+/5) 3-4wks         PT Goal 2       Start:  03/15/24    Expected End:  24       1.\"I would like to improve my mobility\".             "

## 2024-04-05 ENCOUNTER — TREATMENT (OUTPATIENT)
Dept: PHYSICAL THERAPY | Facility: CLINIC | Age: 79
End: 2024-04-05
Payer: MEDICARE

## 2024-04-05 DIAGNOSIS — M48.062 SPINAL STENOSIS, LUMBAR REGION WITH NEUROGENIC CLAUDICATION: ICD-10-CM

## 2024-04-05 PROCEDURE — 97113 AQUATIC THERAPY/EXERCISES: CPT | Mod: GP,CQ | Performed by: PHYSICAL THERAPY ASSISTANT

## 2024-04-05 ASSESSMENT — PAIN - FUNCTIONAL ASSESSMENT: PAIN_FUNCTIONAL_ASSESSMENT: 0-10

## 2024-04-05 ASSESSMENT — PAIN SCALES - GENERAL: PAINLEVEL_OUTOF10: 4

## 2024-04-05 ASSESSMENT — ACTIVITIES OF DAILY LIVING (ADL): EFFECT OF PAIN ON DAILY ACTIVITIES: SEE GOALS

## 2024-04-05 NOTE — PROGRESS NOTES
"Physical Therapy    Physical Therapy Treatment    Patient Name: Corey Freedman  MRN: [id number]  Today's Date: 2024  Time Calculation  Start Time: 1045  Stop Time: 1130  Time Calculation (min): 45 min  PT Therapeutic Procedures Time Entry  Aquatic Therapy Time Entry: 43                   Current Problem  Problem List Items Addressed This Visit             ICD-10-CM       Neuro    Spinal stenosis, lumbar region with neurogenic claudication M48.062        General  Reason for Referral: spinal stenosis with neuro claudication  Referred By: Kayla  Past Medical History Relevant to Rehab: S/P lumbar fusion 23  General Comment:     Subjective   Patient reports no falls and states 4/10 low back pain.  Patient reports has been taking long walks in the afternoon.    Precautions  Precautions  Precautions Comment: cerv fusion ; dizziness; Lymes; heat palp; PVCs; TIA (10yrs post); thor pain; B hip troch bursitis; S/P craniostomy with decomp ();    Pain  Pain Assessment: 0-10  Pain Score: 4  Pain Location: Back  Pain Radiating Towards: B lateral hips  Effect of Pain on Daily Activities: see goals    Objective   SIDE STEPPING x 3 laps  Heel raises x 15  Squats x 15  Hip Flexion x 15  Hip Abd/add x 15  Alt. March Steps x 15  DB ROLLS  [place] x 30\" ea. Dir  ABDOMINAL sm. Blue board x 15 ea.  Flexion/push/pull  UE PADDLES  Open x 20 each   Flex/ext, abd/add, H. Abd/add, push/pull  100% UNLOADING 2 XLG noodles  Bike 5'  Hip Abd/add 5'  Hang 5'  GRADUAL EXIT 3'      Treatments:    OP Education      Assessment  Patient tolerated treatment without increased pain.  Patiient has good form/understanding with there-x and keeps body in good alignment.  Patient has good TrA and keeps intact with there-ex.   Patient needing one UE support with LER there-ex and no support with 100% unloading.   Continue with core stability while performing dyn activity to decrease back pain.  Patient verified by name and .    Plan " " Continue with core strength to improve sleep, standing, ambulation.  Treatment/Interventions: Aquatic therapy, Cryotherapy, Dry needling, Education/ Instruction, Gait training, Hot pack, Manual therapy, Self care/ home management, Therapeutic exercises (IASTM/cupping)  PT Plan: Skilled PT  PT Frequency: 2 times per week  Duration: 4wks  Onset Date: 11/29/23  Certification Period Start Date: 03/15/24  Certification Period End Date: 06/13/24  Rehab Potential: Good    Active       PT Problem       PT Goal 1       Start:  03/15/24    Expected End:  06/13/24       1. Independent HEP to allow for 50% reduction in max ADL C/C sx ( 8/10) 2-3wks  2. 0/10 night time sx to allow for uninterrupted sleep x 1wk ( 7/7-meds) 3-4wks  3. Strength increase to allow for improved ADL bending; lifting (from 4-/5 to 4+/5) 3-4wks         PT Goal 2       Start:  03/15/24    Expected End:  06/13/24       1.\"I would like to improve my mobility\".             "

## 2024-04-10 ENCOUNTER — TREATMENT (OUTPATIENT)
Dept: PHYSICAL THERAPY | Facility: CLINIC | Age: 79
End: 2024-04-10
Payer: MEDICARE

## 2024-04-10 DIAGNOSIS — M48.062 SPINAL STENOSIS, LUMBAR REGION WITH NEUROGENIC CLAUDICATION: ICD-10-CM

## 2024-04-10 PROCEDURE — 97113 AQUATIC THERAPY/EXERCISES: CPT | Mod: GP,CQ | Performed by: PHYSICAL THERAPY ASSISTANT

## 2024-04-10 ASSESSMENT — PAIN SCALES - GENERAL: PAINLEVEL_OUTOF10: 4

## 2024-04-10 ASSESSMENT — ACTIVITIES OF DAILY LIVING (ADL): EFFECT OF PAIN ON DAILY ACTIVITIES: SEE GOALS

## 2024-04-10 ASSESSMENT — PAIN - FUNCTIONAL ASSESSMENT: PAIN_FUNCTIONAL_ASSESSMENT: 0-10

## 2024-04-10 NOTE — PROGRESS NOTES
"Physical Therapy    Physical Therapy Treatment    Patient Name: Corey Freedman  MRN: [id number]  Today's Date: 4/10/2024  Time Calculation  Start Time: 1045  Stop Time: 1130  Time Calculation (min): 45 min  PT Therapeutic Procedures Time Entry  Aquatic Therapy Time Entry: 43                   Current Problem  Problem List Items Addressed This Visit             ICD-10-CM       Neuro    Spinal stenosis, lumbar region with neurogenic claudication M48.062        General  Reason for Referral: spinal stenosis with neuro claudication  Referred By: Kayla  Past Medical History Relevant to Rehab: S/P lumbar fusion 23  General Comment:     Subjective     Patient reports no falls and states low back pain of 4/10.  Precautions  Precautions  Precautions Comment: cerv fusion ; dizziness; Lymes; heat palp; PVCs; TIA (10yrs post); thor pain; B hip troch bursitis; S/P craniostomy with decomp ();    Pain  Pain Assessment: 0-10  Pain Score: 4  Pain Location: Back  Pain Radiating Towards: B lateral hips  Effect of Pain on Daily Activities: see goals    Objective   SIDE STEPPING x 3 laps  Heel raises x 17  Squats x 17  Hip Flexion x 17  Hip Abd/add x 17  Alt. March Steps x 17  DB ROLLS  Wynot x 30\" ea. Dir  ABDOMINAL sm. Blue board x 17 ea.  Flexion/push/pull  UE PADDLES  Open x 20 each   Flex/ext, abd/add, H. Abd/add, push/pull  100% UNLOADING 2 XLG noodles  Bike 5'  Hip Abd/add 5'  Hang 5'  GRADUAL EXIT 3'        Treatments:    OP Education      Assessment  Patient tolerated treatment without increased pain.  Patient has good TrA and keeps intact with there-ex.  Patient needing one UE support with LE there-ex and no support with 100% unloading.   Patient has good form/understanding with there-ex and keeps body in alignment.  Continue with core stability while performing dyn  activity to decrease back pain.  Patient verified by name and .    Plan  Continue with core strength to improve sleep, standing, " "ambulation.  Treatment/Interventions: Aquatic therapy, Cryotherapy, Dry needling, Education/ Instruction, Gait training, Hot pack, Manual therapy, Self care/ home management, Therapeutic exercises (IASTM/cupping)  PT Plan: Skilled PT  PT Frequency: 2 times per week  Duration: 4wks  Onset Date: 11/29/23  Certification Period Start Date: 03/15/24  Certification Period End Date: 06/13/24  Rehab Potential: Good    Active       PT Problem       PT Goal 1       Start:  03/15/24    Expected End:  06/13/24       1. Independent HEP to allow for 50% reduction in max ADL C/C sx ( 8/10) 2-3wks  2. 0/10 night time sx to allow for uninterrupted sleep x 1wk ( 7/7-meds) 3-4wks  3. Strength increase to allow for improved ADL bending; lifting (from 4-/5 to 4+/5) 3-4wks         PT Goal 2       Start:  03/15/24    Expected End:  06/13/24       1.\"I would like to improve my mobility\".             "

## 2024-04-12 ENCOUNTER — TREATMENT (OUTPATIENT)
Dept: PHYSICAL THERAPY | Facility: CLINIC | Age: 79
End: 2024-04-12
Payer: MEDICARE

## 2024-04-12 DIAGNOSIS — M48.062 SPINAL STENOSIS, LUMBAR REGION WITH NEUROGENIC CLAUDICATION: ICD-10-CM

## 2024-04-12 PROCEDURE — 97110 THERAPEUTIC EXERCISES: CPT | Mod: GP,CQ

## 2024-04-12 ASSESSMENT — PAIN SCALES - GENERAL: PAINLEVEL_OUTOF10: 4

## 2024-04-12 ASSESSMENT — PAIN - FUNCTIONAL ASSESSMENT: PAIN_FUNCTIONAL_ASSESSMENT: 0-10

## 2024-04-12 NOTE — PROGRESS NOTES
"Physical Therapy Treatment    Patient Name: Corey Freedman  MRN: [id number]  Today's Date: 4/12/2024  Time Calculation  Start Time: 1045  Stop Time: 1130  Time Calculation (min): 45 min  PT Therapeutic Procedures Time Entry  Therapeutic Exercise Time Entry: 40       Assessment:   Patient rosina's challenges with SLS, but is able to self correct. Demo's more challenges on LLE compared to RLE. Patient requires verbal cues to maintain TrA and not to hold breath with patient rosina'g good understanding.     Plan:  Continue to work on improving strength to be able to get a full nights sleep uninterrupted. -AB.     OP PT Plan  Treatment/Interventions: Aquatic therapy, Cryotherapy, Dry needling, Education/ Instruction, Gait training, Hot pack, Manual therapy, Self care/ home management, Therapeutic exercises (IASTM/cupping)  PT Plan: Skilled PT  PT Frequency: 2 times per week  Duration: 4wks  Onset Date: 11/29/23  Certification Period Start Date: 03/15/24  Certification Period End Date: 06/13/24  Rehab Potential: Good    Current Problem  Problem List Items Addressed This Visit             ICD-10-CM    Spinal stenosis, lumbar region with neurogenic claudication M48.062       Subjective   General  Reason for Referral: spinal stenosis with neuro claudication  Referred By: Kayla  Past Medical History Relevant to Rehab: S/P lumbar fusion 11/29/23  General Comment: 6/9  Patient states that his pain in his Lumbar is worse in the morning and at night.     Precautions  Precautions  Precautions Comment: cerv fusion 2009; dizziness; Lymes; heat palp; PVCs; TIA (10yrs post); thor pain; B hip troch bursitis; S/P craniostomy with decomp (2022);    Pain  Pain Assessment: 0-10  Pain Score: 4  Pain Location: Back    Objective     Treatments:  Therapeutic Exercise: 40 minutes, 3 units  SciFit x6' (N)  Slantboard 2x1' (N)  Step ups 6\" step 2x10 Fwd/Lateral Bilat (N)  SLS 3x30\" Bilat (N)  TrA x10 5\" hold (N)  Bridging x10 with TrA " "(N)  SLR x10 Bilat with TrA (N)    OP EDUCATION:       Goals:  Active       PT Problem       PT Goal 1       Start:  03/15/24    Expected End:  06/13/24       1. Independent HEP to allow for 50% reduction in max ADL C/C sx ( 8/10) 2-3wks  2. 0/10 night time sx to allow for uninterrupted sleep x 1wk ( 7/7-meds) 3-4wks  3. Strength increase to allow for improved ADL bending; lifting (from 4-/5 to 4+/5) 3-4wks         PT Goal 2       Start:  03/15/24    Expected End:  06/13/24       1.\"I would like to improve my mobility\".            "

## 2024-04-17 ENCOUNTER — TREATMENT (OUTPATIENT)
Dept: PHYSICAL THERAPY | Facility: CLINIC | Age: 79
End: 2024-04-17
Payer: MEDICARE

## 2024-04-17 DIAGNOSIS — M48.062 SPINAL STENOSIS, LUMBAR REGION WITH NEUROGENIC CLAUDICATION: ICD-10-CM

## 2024-04-17 PROCEDURE — 97110 THERAPEUTIC EXERCISES: CPT | Mod: GP,CQ

## 2024-04-17 ASSESSMENT — PAIN SCALES - GENERAL: PAINLEVEL_OUTOF10: 8

## 2024-04-17 ASSESSMENT — PAIN - FUNCTIONAL ASSESSMENT: PAIN_FUNCTIONAL_ASSESSMENT: 0-10

## 2024-04-17 NOTE — PROGRESS NOTES
Physical Therapy Treatment    Patient Name: Corey Freedman  MRN: [id number]  Today's Date: 4/17/2024  Time Calculation  Start Time: 1045  Stop Time: 1130  Time Calculation (min): 45 min  PT Therapeutic Procedures Time Entry  Therapeutic Exercise Time Entry: 40       Assessment:   Patient ryan's improvements in fwd and lateral step ups this date. Ryan's improvements in SLS but still does require intermittent fingertip touching. Demo's fatigue in hip adductors. Demo's improvements in symptoms at end of session.     Plan:  Continue to work on improving strength to be able to get a full nights sleep uninterrupted. -AB.     OP PT Plan  Treatment/Interventions: Aquatic therapy, Cryotherapy, Dry needling, Education/ Instruction, Gait training, Hot pack, Manual therapy, Self care/ home management, Therapeutic exercises (IASTM/cupping)  PT Plan: Skilled PT  PT Frequency: 2 times per week  Duration: 4wks  Onset Date: 11/29/23  Certification Period Start Date: 03/15/24  Certification Period End Date: 06/13/24  Rehab Potential: Good    Current Problem  Problem List Items Addressed This Visit             ICD-10-CM    Spinal stenosis, lumbar region with neurogenic claudication M48.062       Subjective   General  Reason for Referral: spinal stenosis with neuro claudication  Referred By: Kayla  Past Medical History Relevant to Rehab: S/P lumbar fusion 11/29/23  General Comment: 7/9  Patient states that he felt pretty good on Monday, and states that he went out and was spraying round up. States the next morning and day he felt fine, but states this morning he woke up around 5 with discomfort. Patient states that he wasn't bending over when he was spraying round up.     Precautions  Precautions  Precautions Comment: cerv fusion 2009; dizziness; Lymes; heat palp; PVCs; TIA (10yrs post); thor pain; B hip troch bursitis; S/P craniostomy with decomp (2022);    Pain  Pain Assessment: 0-10  Pain Score: 8  Pain Location:  "Back    Objective     Treatments:  Therapeutic Exercise: 40 minutes, 3 units  SciFit x6'   Slantboard 2x1'   Step ups 6\" step 2x10 Fwd/Lateral Bilat   SLS 3x30\" Bilat   TrA x10 5\" hold   Bridging x10 with TrA (X)  SLR x10 Bilat with TrA (X)  Seated hip adduction 2x10 5\" hold (N)  Seated hip abduction 2x10 Green Tband (N)  Seated marches Green Tband 2x10 (N)    OP EDUCATION:       Goals:  Active       PT Problem       PT Goal 1       Start:  03/15/24    Expected End:  06/13/24       1. Independent HEP to allow for 50% reduction in max ADL C/C sx ( 8/10) 2-3wks  2. 0/10 night time sx to allow for uninterrupted sleep x 1wk ( 7/7-meds) 3-4wks  3. Strength increase to allow for improved ADL bending; lifting (from 4-/5 to 4+/5) 3-4wks         PT Goal 2       Start:  03/15/24    Expected End:  06/13/24       1.\"I would like to improve my mobility\".            "

## 2024-04-19 ENCOUNTER — TREATMENT (OUTPATIENT)
Dept: PHYSICAL THERAPY | Facility: CLINIC | Age: 79
End: 2024-04-19
Payer: MEDICARE

## 2024-04-19 DIAGNOSIS — M48.062 SPINAL STENOSIS, LUMBAR REGION WITH NEUROGENIC CLAUDICATION: ICD-10-CM

## 2024-04-19 PROCEDURE — 97110 THERAPEUTIC EXERCISES: CPT | Mod: GP,CQ

## 2024-04-19 ASSESSMENT — PAIN SCALES - GENERAL: PAINLEVEL_OUTOF10: 4

## 2024-04-19 ASSESSMENT — PAIN - FUNCTIONAL ASSESSMENT: PAIN_FUNCTIONAL_ASSESSMENT: 0-10

## 2024-04-19 NOTE — PROGRESS NOTES
"Physical Therapy Treatment    Patient Name: Corey Freedman  MRN: [id number]  Today's Date: 4/19/2024  Time Calculation  Start Time: 1049  Stop Time: 1133  Time Calculation (min): 44 min  PT Therapeutic Procedures Time Entry  Therapeutic Exercise Time Entry: 40       Assessment:   Patient rosina's challenges with SLS this date with LLE compared to RLE. Patient rosina's improvements in step ups with no c/o increased symptoms. HEP updated and reviewed with patient rosina'concepcion good understanding.     Plan:  Continue to work on improving strength to be able to get a full nights sleep uninterrupted. -AB.     OP PT Plan  Treatment/Interventions: Aquatic therapy, Cryotherapy, Dry needling, Education/ Instruction, Gait training, Hot pack, Manual therapy, Self care/ home management, Therapeutic exercises (IASTM/cupping)  PT Plan: Skilled PT  PT Frequency: 2 times per week  Duration: 4wks  Onset Date: 11/29/23  Certification Period Start Date: 03/15/24  Certification Period End Date: 06/13/24  Rehab Potential: Good    Current Problem  Problem List Items Addressed This Visit             ICD-10-CM    Spinal stenosis, lumbar region with neurogenic claudication M48.062       Subjective   General  Reason for Referral: spinal stenosis with neuro claudication  Referred By: Kayla  Past Medical History Relevant to Rehab: S/P lumbar fusion 11/29/23  General Comment: 8/9  Patient states that he has been doing flexion based exercises at home, but not to the point of pain. States that he was able to walk 2 miles yesterday with no pain in LE's.     Precautions  Precautions  Precautions Comment: cerv fusion 2009; dizziness; Lymes; heat palp; PVCs; TIA (10yrs post); thor pain; B hip troch bursitis; S/P craniostomy with decomp (2022);    Pain  Pain Assessment: 0-10  Pain Score: 4  Pain Location: Back    Objective     Treatments:  Therapeutic Exercise: 40 minutes, 3 units  SciFit x6'   Slantboard 2x1'   Step ups 6\" step 2x10 Fwd/Lateral Bilat " "  SLS 3x30\" Bilat   TrA x10 5\" hold   Bridging x10 with TrA (X)  SLR x10 Bilat with TrA (X)  Seated hip adduction 2x10 5\" hold   Seated hip abduction 2x10 Green Tband   Seated marches Green Tband 2x10     OP EDUCATION:   Access Code: [email or web address]  URL: [email or web address]  Date: 04/19/2024  Prepared by: Erin Baker    Exercises  - Supine Piriformis Stretch with Foot on Ground  - 1 x daily - 7 x weekly - 3 reps - 30 hold  - Supine Hip Adduction Isometric with Ball  - 1 x daily - 7 x weekly - 3 sets - 10 reps - 5 hold  - Hooklying Clamshell with Resistance  - 1 x daily - 7 x weekly - 3 sets - 10 reps  - Supine Single Knee to Chest Stretch  - 1 x daily - 7 x weekly - 3 sets - 10 reps  - Standing Hip Abduction  - 1 x daily - 7 x weekly - 3 sets - 10 reps  - Standing Hip Flexion with Counter Support  - 1 x daily - 7 x weekly - 3 sets - 10 reps  - Standing Hip Extension with Counter Support  - 1 x daily - 7 x weekly - 3 sets - 10 reps  - Supine Bridge  - 1 x daily - 7 x weekly - 3 sets - 10 reps  - Supine Straight Leg Raises  - 1 x daily - 7 x weekly - 3 sets - 10 reps    Goals:  Active       PT Problem       PT Goal 1       Start:  03/15/24    Expected End:  06/13/24       1. Independent HEP to allow for 50% reduction in max ADL C/C sx ( 8/10) 2-3wks  2. 0/10 night time sx to allow for uninterrupted sleep x 1wk ( 7/7-meds) 3-4wks  3. Strength increase to allow for improved ADL bending; lifting (from 4-/5 to 4+/5) 3-4wks         PT Goal 2       Start:  03/15/24    Expected End:  06/13/24       1.\"I would like to improve my mobility\".            "

## 2024-04-23 ENCOUNTER — OFFICE VISIT (OUTPATIENT)
Dept: PRIMARY CARE | Facility: CLINIC | Age: 79
End: 2024-04-23
Payer: MEDICARE

## 2024-04-23 VITALS
WEIGHT: 163.6 LBS | SYSTOLIC BLOOD PRESSURE: 150 MMHG | DIASTOLIC BLOOD PRESSURE: 70 MMHG | HEART RATE: 92 BPM | OXYGEN SATURATION: 95 % | BODY MASS INDEX: 24.8 KG/M2 | HEIGHT: 68 IN

## 2024-04-23 DIAGNOSIS — M54.17 LUMBOSACRAL RADICULOPATHY: ICD-10-CM

## 2024-04-23 DIAGNOSIS — M79.604 LEG PAIN, BILATERAL: ICD-10-CM

## 2024-04-23 DIAGNOSIS — G60.3 IDIOPATHIC PROGRESSIVE NEUROPATHY: ICD-10-CM

## 2024-04-23 DIAGNOSIS — F51.01 PRIMARY INSOMNIA: ICD-10-CM

## 2024-04-23 DIAGNOSIS — G89.29 OTHER CHRONIC PAIN: ICD-10-CM

## 2024-04-23 DIAGNOSIS — Z00.00 ROUTINE GENERAL MEDICAL EXAMINATION AT HEALTH CARE FACILITY: ICD-10-CM

## 2024-04-23 DIAGNOSIS — R79.89 LOW SERUM TESTOSTERONE LEVEL: ICD-10-CM

## 2024-04-23 DIAGNOSIS — R11.2 NAUSEA AND VOMITING, UNSPECIFIED VOMITING TYPE: ICD-10-CM

## 2024-04-23 DIAGNOSIS — I10 BENIGN ESSENTIAL HYPERTENSION: Primary | ICD-10-CM

## 2024-04-23 DIAGNOSIS — T78.40XS ALLERGY, SEQUELA: ICD-10-CM

## 2024-04-23 DIAGNOSIS — Z12.5 SCREENING FOR PROSTATE CANCER: ICD-10-CM

## 2024-04-23 DIAGNOSIS — E29.1 HYPOGONADISM IN MALE: ICD-10-CM

## 2024-04-23 DIAGNOSIS — F19.20 CONTROLLED DRUG DEPENDENCE (MULTI): ICD-10-CM

## 2024-04-23 DIAGNOSIS — E78.2 HYPERLIPEMIA, MIXED: ICD-10-CM

## 2024-04-23 DIAGNOSIS — J45.20 MILD INTERMITTENT REACTIVE AIRWAY DISEASE WITHOUT COMPLICATION (HHS-HCC): ICD-10-CM

## 2024-04-23 DIAGNOSIS — K21.9 GASTROESOPHAGEAL REFLUX DISEASE WITHOUT ESOPHAGITIS: ICD-10-CM

## 2024-04-23 DIAGNOSIS — S06.5XAS: ICD-10-CM

## 2024-04-23 DIAGNOSIS — M79.605 LEG PAIN, BILATERAL: ICD-10-CM

## 2024-04-23 DIAGNOSIS — F41.9 ANXIETY: ICD-10-CM

## 2024-04-23 PROCEDURE — 99214 OFFICE O/P EST MOD 30 MIN: CPT | Performed by: FAMILY MEDICINE

## 2024-04-23 PROCEDURE — 1160F RVW MEDS BY RX/DR IN RCRD: CPT | Performed by: FAMILY MEDICINE

## 2024-04-23 PROCEDURE — 3077F SYST BP >= 140 MM HG: CPT | Performed by: FAMILY MEDICINE

## 2024-04-23 PROCEDURE — 3078F DIAST BP <80 MM HG: CPT | Performed by: FAMILY MEDICINE

## 2024-04-23 PROCEDURE — 1159F MED LIST DOCD IN RCRD: CPT | Performed by: FAMILY MEDICINE

## 2024-04-23 PROCEDURE — 1157F ADVNC CARE PLAN IN RCRD: CPT | Performed by: FAMILY MEDICINE

## 2024-04-23 PROCEDURE — 1036F TOBACCO NON-USER: CPT | Performed by: FAMILY MEDICINE

## 2024-04-23 RX ORDER — HYDROCODONE BITARTRATE AND ACETAMINOPHEN 5; 325 MG/1; MG/1
1 TABLET ORAL EVERY 8 HOURS PRN
COMMUNITY
Start: 2024-03-12 | End: 2024-04-23 | Stop reason: WASHOUT

## 2024-04-23 RX ORDER — MONTELUKAST SODIUM 10 MG/1
10 TABLET ORAL NIGHTLY
Qty: 90 TABLET | Refills: 3 | Status: SHIPPED | OUTPATIENT
Start: 2024-04-23 | End: 2025-04-23

## 2024-04-23 RX ORDER — ONDANSETRON 4 MG/1
4 TABLET, ORALLY DISINTEGRATING ORAL EVERY 6 HOURS PRN
Qty: 90 TABLET | Refills: 1 | Status: SHIPPED | OUTPATIENT
Start: 2024-04-23

## 2024-04-23 RX ORDER — HYDROCODONE BITARTRATE AND ACETAMINOPHEN 5; 325 MG/1; MG/1
1 TABLET ORAL EVERY 8 HOURS PRN
Qty: 56 TABLET | Refills: 0 | Status: SHIPPED | OUTPATIENT
Start: 2024-04-23 | End: 2024-05-23

## 2024-04-23 RX ORDER — ALBUTEROL SULFATE 90 UG/1
2 AEROSOL, METERED RESPIRATORY (INHALATION) EVERY 6 HOURS PRN
Qty: 18 G | Refills: 11 | Status: SHIPPED | OUTPATIENT
Start: 2024-04-23 | End: 2025-04-23

## 2024-04-23 ASSESSMENT — ENCOUNTER SYMPTOMS
NAUSEA: 0
CONSTIPATION: 0
DIFFICULTY URINATING: 0
NERVOUS/ANXIOUS: 0
ABDOMINAL PAIN: 0
APPETITE CHANGE: 0
DYSPHORIC MOOD: 0
DIARRHEA: 0
RHINORRHEA: 0
UNEXPECTED WEIGHT CHANGE: 0
COUGH: 0
ARTHRALGIAS: 0
ADENOPATHY: 0
SHORTNESS OF BREATH: 0
VOMITING: 0
BACK PAIN: 1
HEADACHES: 0
ABDOMINAL DISTENTION: 0
WHEEZING: 0
NUMBNESS: 0
TROUBLE SWALLOWING: 0
LIGHT-HEADEDNESS: 0
PALPITATIONS: 0
FATIGUE: 0
DIZZINESS: 0
SLEEP DISTURBANCE: 0
ACTIVITY CHANGE: 0

## 2024-04-23 NOTE — PROGRESS NOTES
Subjective   Patient ID: Corey Freedman is a 79 y.o. male who presents for 3 MO F/U.    HPI   No headache, chest pain, shortness of breath, dizziness, lightheadedness, or edema  Finishing PT in the next week, last seen neurosurgery earlier this month, weaning pain medicines  Using 1/2 tablet of Norco 2-3 times a day with APAP, helping with pain  Uses Buspar at night to help sleep mostly, emotionally doing OK  HBP checks, usually below 140/90      OARRS:  Mahendra Rossi MD on 4/23/2024  3:26 PM  I have personally reviewed the OARRS report for Corey Freedman. I have considered the risks of abuse, dependence, addiction and diversion    Is the patient prescribed a combination of a benzodiazepine and opioid?  No    Last Urine Drug Screen / ordered today: No  Recent Results (from the past 8760 hour(s))   Drug Screen, Urine With Reflex to Confirmation    Collection Time: 07/06/23  3:50 PM   Result Value Ref Range    DRUG SCREEN COMMENT URINE SEE BELOW     Amphetamine Screen, Urine PRESUMPTIVE NEGATIVE NEGATIVE    Barbiturate Screen, Urine PRESUMPTIVE NEGATIVE NEGATIVE    BENZODIAZEPINE (PRESENCE) IN URINE BY SCREEN METHOD PRESUMPTIVE NEGATIVE NEGATIVE    Cannabinoid Screen, Urine PRESUMPTIVE NEGATIVE NEGATIVE    Cocaine Screen, Urine PRESUMPTIVE NEGATIVE NEGATIVE    Fentanyl, Ur PRESUMPTIVE NEGATIVE NEGATIVE    Methadone Screen, Urine PRESUMPTIVE NEGATIVE NEGATIVE    Opiate Screen, Urine PRESUMPTIVE NEGATIVE NEGATIVE    Oxycodone Screen, Ur PRESUMPTIVE NEGATIVE NEGATIVE    PCP Screen, Urine PRESUMPTIVE NEGATIVE NEGATIVE     Results are as expected.         Controlled Substance Agreement:  Date of the Last Agreement: 7/6/23  Reviewed Controlled Substance Agreement including but not limited to the benefits, risks, and alternatives to treatment with a Controlled Substance medication(s).    Testosterone:  What is the patient's goal of therapy? Increase levels  Is this being achieved with current treatment?  yes    I attest the patient does not have: Polycythemia or Prostate Cancer    Last Testosterone check:  Testosterone, Free   Date Value Ref Range Status   01/22/2024 140.9 (H) 30.0 - 135.0 pg/mL Final     Comment:        This test was developed and its analytical performance  characteristics have been determined by [facility], [place]. It has  not been cleared or approved by the U.S. Food and Drug  Administration. This assay has been validated pursuant  to the CLIA regulations and is used for clinical  purposes.            Testosterone   Date Value Ref Range Status   10/10/2019 717 240 - 1,000 ng/dL Final     Comment:      Nandrolone decanoate, 11 Beta-hydroxytestosterone, and    11-keto-testosterone strongly cross react with this test    method.    Patients receiving more than 5 mg/day of biotin may have interference   in test results.  A sample should be taken no sooner than eight hours   after previous dose. Contact the testing laboratory for additional   information.       Testosterone, Total, LC-MS/MS   Date Value Ref Range Status   01/22/2024 1036 250 - 1100 ng/dL Final     Comment:        Men with clinically significant hypogonadal  symptoms and testosterone values repeatedly in  the range of the 200-300 ng/dL or less, may  benefit from testosterone treatment after  adequate risk and benefits counseling.            For additional information, please refer to  http://education.[employer]/faq/  [email or web address]  (This link is being provided for informational/  educational purposes only.)     This test was developed and its analytical performance  characteristics have been determined by [employer] [place], [place]. It has  not been cleared or approved by the U.S. Food and Drug  Administration. This assay has been validated pursuant  to the CLIA regulations and is used for clinical  purposes.              Last CBC:    No results found  "for: \"CBCDIF\", \"BMCBC\", \"PR1\"    Last PSA:   Prostate Specific Antigen,Screen   Date Value Ref Range Status   06/29/2023 1.17 0.00 - 4.00 ng/mL Final     Comment:     The FDA requires that the method used for PSA assay be   reported to the physician. Values obtained with different   assay methods must not be used interchangeably. This test  was performed at [facility] using the Access   Hybritech PSA assay is a two-site immunoenzymatic sandwich   assay. The assay is approved for measurement of   prostate-specific antigen (PSA)in serum and may be used   in conjunction with a digital rectal examination in men   50 years and older as an aid in detection of prostate   cancer.  5-Alpha-reductase inhibitors (e.g. Proscar, Finasteride,   Avodart, Dutasteride and Kia) for the treatment of BPH   have been shown to lower PSA levels by an average of 50%   after 6 months of treatment.       Activities of Daily Living:   Is your overall impression that this patient is benefiting (symptom reduction outweighs side effects) from testosterone therapy? Yes     1. Physical Functioning: Better  2. Family Relationship: Same  3. Social Relationship: Same  4. Mood: Better  5. Sleep Patterns: Same  6. Overall Function: Same       and Lyrica:  What is the patient's goal of therapy? To help with pain  Is this being achieved with current treatment? yes    Pain Assessment:  No data recorded    Activities of Daily Living:  Is your overall impression that this patient is benefiting (symptom reduction outweighs side effects) from Lyrica therapy? Yes     1. Physical Functioning: Same  2. Family Relationship: Same  3. Social Relationship: Same  4. Mood: Same  5. Sleep Patterns: Same  6. Overall Function: Same      Review of Systems   Constitutional:  Negative for activity change, appetite change, fatigue and unexpected weight change.   HENT:  Negative for ear pain, nosebleeds, rhinorrhea, sneezing and trouble swallowing.  " "  Respiratory:  Negative for cough, shortness of breath and wheezing.    Cardiovascular:  Negative for chest pain, palpitations and leg swelling.   Gastrointestinal:  Negative for abdominal distention, abdominal pain, constipation, diarrhea, nausea and vomiting.   Genitourinary:  Negative for difficulty urinating.   Musculoskeletal:  Positive for back pain. Negative for arthralgias and gait problem.   Skin:  Negative for rash.   Neurological:  Negative for dizziness, light-headedness, numbness and headaches.   Hematological:  Negative for adenopathy.   Psychiatric/Behavioral:  Negative for behavioral problems, dysphoric mood and sleep disturbance. The patient is not nervous/anxious.    All other systems reviewed and are negative.      Objective   /70   Pulse 92   Ht 1.727 m (5' 8\")   Wt 74.2 kg (163 lb 9.6 oz)   SpO2 95%   BMI 24.88 kg/m²     Physical Exam  Vitals and nursing note reviewed.   Constitutional:       Appearance: Normal appearance.   HENT:      Head: Normocephalic and atraumatic.      Right Ear: Tympanic membrane, ear canal and external ear normal.      Left Ear: Tympanic membrane, ear canal and external ear normal.      Nose: Nose normal.      Mouth/Throat:      Mouth: Mucous membranes are moist.      Pharynx: Oropharynx is clear.   Cardiovascular:      Rate and Rhythm: Normal rate and regular rhythm.      Pulses: Normal pulses.      Heart sounds: Normal heart sounds.   Pulmonary:      Effort: Pulmonary effort is normal.      Breath sounds: Normal breath sounds.   Musculoskeletal:         General: Normal range of motion.      Cervical back: Normal range of motion and neck supple.   Neurological:      Mental Status: He is alert.   Psychiatric:         Mood and Affect: Mood normal.         Behavior: Behavior normal.         Assessment/Plan   Problem List Items Addressed This Visit             ICD-10-CM    Allergies T78.40XA    Relevant Medications    montelukast (Singulair) 10 mg tablet    " Other Relevant Orders    Follow Up In Primary Care - Established    Anxiety F41.9     Currently stable uses buspirone mostly just at night to help sleep.         Relevant Orders    Follow Up In Primary Care - Established    Benign essential hypertension - Primary I10     Blood pressure slightly elevated today, encouraged to check home blood pressures with a goal to be less than 140/90.  Check blood testing at next office visit.         Relevant Orders    Follow Up In Primary Care - Established    Comprehensive Metabolic Panel    Lumbosacral radiculopathy M54.17     Post surgery in December, still using pain medication, advised about tapering this medication with the hope of eventually discontinuing it altogether.  He was given a prescription today of hydrocodone, see prescribing database was reviewed, hopefully this will be his last prescription for this pain medicine.         Relevant Medications    HYDROcodone-acetaminophen (Norco) 5-325 mg tablet    Other Relevant Orders    Follow Up In Primary Care - Established    Chronic pain G89.29    Relevant Medications    HYDROcodone-acetaminophen (Norco) 5-325 mg tablet    Other Relevant Orders    Follow Up In Primary Care - Established    GERD (gastroesophageal reflux disease) K21.9     Stable, no issues with dysphagia, not needing to use PPI.         Relevant Orders    Follow Up In Primary Care - Established    Hypogonadism in male E29.1     Continue with testosterone replacement, controlled medicine agreement is up-to-date, check PSA testing, CBC CMP and lipid profile at follow-up appointment.         Relevant Orders    Follow Up In Primary Care - Established    CBC and Auto Differential    Testosterone,Free and Total    Comprehensive Metabolic Panel    Lipid Panel    Prostate Specific Antigen, Screen    Insomnia G47.00    Relevant Orders    Follow Up In Primary Care - Established    Peripheral neuropathy G62.9     Continue with current medication continue with  pregabalin         Relevant Orders    Follow Up In Primary Care - Established    RAD (reactive airway disease) ([facility]) J45.909     Prescribed albuterol to use as needed, continue with allergy medication and treatment.         Relevant Medications    montelukast (Singulair) 10 mg tablet    albuterol 90 mcg/actuation inhaler    Other Relevant Orders    Follow Up In Primary Care - Established     Other Visit Diagnoses         Codes    Leg pain, bilateral     M79.604, M79.605    Relevant Orders    Follow Up In Primary Care - Established    Post-traumatic subdural hematoma, with unknown loss of consciousness status, sequela (CMS-HCC)     S06.5XAS    Relevant Orders    Follow Up In Primary Care - Established    Low serum testosterone level     R79.89    Relevant Orders    Follow Up In Primary Care - Established    CBC and Auto Differential    Testosterone,Free and Total    Comprehensive Metabolic Panel    Prostate Specific Antigen, Screen    Controlled drug dependence (Multi)     F19.20    Relevant Orders    Follow Up In Primary Care - Established    Routine general medical examination at health care facility     Z00.00    Relevant Orders    Follow Up In Primary Care - Established    Nausea and vomiting, unspecified vomiting type     R11.2    Relevant Medications    ondansetron ODT (Zofran-ODT) 4 mg disintegrating tablet    Other Relevant Orders    Follow Up In Primary Care - Established    Screening for prostate cancer     Z12.5    Relevant Orders    Follow Up In Primary Care - Established    Prostate Specific Antigen, Screen    Hyperlipemia, mixed     E78.2    Relevant Orders    Lipid Panel

## 2024-04-23 NOTE — ASSESSMENT & PLAN NOTE
Post surgery in December, still using pain medication, advised about tapering this medication with the hope of eventually discontinuing it altogether.  He was given a prescription today of hydrocodone, see prescribing database was reviewed, hopefully this will be his last prescription for this pain medicine.

## 2024-04-23 NOTE — PATIENT INSTRUCTIONS
Use albuterol as needed, get blood testing prior to next office visit, check home blood pressures, goal to be less than 140/90

## 2024-04-23 NOTE — ASSESSMENT & PLAN NOTE
After Visit Summary   6/12/2017    Jayy Gill    MRN: [id number]           Patient Information     Date Of Birth          1973        Visit Information        Provider Department      6/12/2017 8:00 AM Sean Hayden, [facility]        Today's Diagnoses     Congestive heart failure, unspecified congestive heart failure chronicity, unspecified congestive heart failure type (H)    -  1    Pulmonary hypertension        ESRD (end stage renal disease) on dialysis        Gastroesophageal reflux disease, esophagitis presence not specified        Pruritic disorder           Follow-ups after your visit        Your next 10 appointments already scheduled     Jun 22, 2017 12:00 PM CDT   Paracentesis Visit with  Spec Inf Para Provider,  39 ATC   [facility] ([facility])    [address]  2nd Floor  [facility] [address] [phone]            Aug 29, 2017  9:00 AM CDT   Lab with  LAB   [facility] ([facility])    [address]  1st Floor  [facility] [id number] [phone]            Aug 29, 2017 10:00 AM CDT   (Arrive by 9:45 AM)   Return General Liver with Rocky Rojo MD   [facility] ([facility])    [address]  3rd Floor  [facility] [id number] [phone]              Who to contact     If you have questions or need follow up information about today's clinic visit or your schedule please contact [facility] directly at [phone].  Normal or non-critical lab and imaging results will be communicated to you by MyChart, letter or phone within 4 business days after the clinic has received the results. If you do not hear from us within 7 days, please contact the clinic through MyChart or phone. If you have a critical or abnormal lab result, we will notify you by phone as soon as  Continue with testosterone replacement, controlled medicine agreement is up-to-date, check PSA testing, CBC CMP and lipid profile at follow-up appointment.   "possible.  Submit refill requests through [employer] or call your pharmacy and they will forward the refill request to us. Please allow 3 business days for your refill to be completed.          Additional Information About Your Visit        C[employer] and [employer] Information     [employer] lets you send messages to your doctor, view your test results, renew your prescriptions, schedule appointments and more. To sign up, go to [email or web address] . Click on \"Log in\" on the left side of the screen, which will take you to the Welcome page. Then click on \"Sign up Now\" on the right side of the page.     You will be asked to enter the access code listed below, as well as some personal information. Please follow the directions to create your username and password.     Your access code is: [email or web address]  Expires: 2017  3:55 PM     Your access code will  in 90 days. If you need help or a new code, please call your [facility] clinic or [phone].        Care EveryWhere ID     This is your Care EveryWhere ID. This could be used by other organizations to access your [facility] medical records  [id number]         Blood Pressure from Last 3 Encounters:   17 90/59   17 (!) 78/47   17 104/64    Weight from Last 3 Encounters:   17 163 lb 14.4 oz (74.3 kg)   17 157 lb 3 oz (71.3 kg)   17 165 lb 8 oz (75.1 kg)              Today, you had the following     No orders found for display       Primary Care Provider Office Phone # Fax #    Milly Healy [phone]       [address]  [facility] [address]        Thank you!     Thank you for choosing [facility]  for your care. Our goal is always to provide you with excellent care. Hearing back from our patients is one way we can continue to improve our services. Please take a few minutes to complete the written survey that you may receive in the mail after your visit with us. Thank you!   "           Your Updated Medication List - Protect others around you: Learn how to safely use, store and throw away your medicines at www.disposemymeds.org.          This list is accurate as of: 6/12/17  9:26 AM.  Always use your most recent med list.                   Brand Name Dispense Instructions for use    aspirin 81 MG tablet      Take 81 mg by mouth daily       hydrOXYzine 25 MG capsule    VISTARIL    120 capsule    Take 2 capsules (50 mg) by mouth 3 times daily as needed for itching       loratadine 10 MG tablet    CLARITIN    90 tablet    Take 1 tablet (10 mg) by mouth daily       macitentan 10 MG tablet    OPSUMIT    30 tablet    Take 1 tablet (10 mg) by mouth daily       midodrine HCl 10 MG Tabs     90 tablet    Take 20 mg by mouth 3 times daily       omeprazole 40 MG capsule    priLOSEC    90 capsule    Take 1 capsule (40 mg) by mouth daily Take 30-60 minutes before a meal.       paricalcitol 5 MCG/ML injection    ZEMPLAR     2 mcg 2 mcg by IV Push route dialysis.       PHOSLO 667 MG Caps capsule   Generic drug:  calcium acetate     180 capsule    Take 2 capsules (1,334 mg) by mouth 3 times daily (with meals)       RENAL 1 MG Caps      Take 1 mg by mouth Take 1 capsule (1 mg) by mouth daily in the afternoon.  Indications: Nutritional Support       sildenafil 20 MG tablet    REVATIO/VIAGRA    180 tablet    Take 2 tablets (40 mg) by mouth 3 times daily       torsemide 20 MG tablet    DEMADEX    90 tablet    Take 3 tablets (60 mg) by mouth 2 times daily

## 2024-04-23 NOTE — ASSESSMENT & PLAN NOTE
Blood pressure slightly elevated today, encouraged to check home blood pressures with a goal to be less than 140/90.  Check blood testing at next office visit.

## 2024-04-24 ENCOUNTER — TREATMENT (OUTPATIENT)
Dept: PHYSICAL THERAPY | Facility: CLINIC | Age: 79
End: 2024-04-24
Payer: MEDICARE

## 2024-04-24 DIAGNOSIS — M48.062 SPINAL STENOSIS, LUMBAR REGION WITH NEUROGENIC CLAUDICATION: ICD-10-CM

## 2024-04-24 PROCEDURE — 97535 SELF CARE MNGMENT TRAINING: CPT | Mod: GP

## 2024-04-24 ASSESSMENT — PAIN SCALES - GENERAL: PAINLEVEL_OUTOF10: 4

## 2024-04-24 ASSESSMENT — PAIN - FUNCTIONAL ASSESSMENT: PAIN_FUNCTIONAL_ASSESSMENT: 0-10

## 2024-04-24 NOTE — PROGRESS NOTES
"Physical Therapy Treatment    Patient Name: Corey Freedman  MRN: [id number]  Today's Date: 2024  Time Calculation  Start Time: 1032  Stop Time: 1056  Time Calculation (min): 24 min      PT Therapeutic Procedures Time Entry  Self-Care/Home Mgmt Training: 10                         General:  General  Reason for Referral: spinal stenosis with neuro claudication  Referred By: Kayla  Past Medical History Relevant to Rehab: S/P lumbar fusion 23  General Comment:       Current Problem  Problem List Items Addressed This Visit             ICD-10-CM    Spinal stenosis, lumbar region with neurogenic claudication M48.062         Assessment:Patient identity confirmed today with name/. ;  ( 0 ) falls since last clinic visit; Reviewed ongoing HEP today (including independent pool vs possible inversion table)      Plan:  This patient agrees to discharge to ongoing HEP and home management with (fair  ) progress achieved.       Subjective: I have   4/10 pain right now-which is acceptable to me.  I do feel that the pool is comforting.      Precautions  Precautions  Precautions Comment: cerv fusion ; dizziness; Lymes; heat palp; PVCs; TIA (10yrs post); thor pain; B hip troch bursitis; S/P craniostomy with decomp ();      Pain  Pain Assessment: 0-10  Pain Score: 4  Pain Location: Back    Objective  See goals;   Treatments:  Reviewed ongoing HEP today.   Reassess  NOT TODAY   SciFit x6'   Slantboard 2x1'   Step ups 6\" step 2x10 Fwd/Lateral Bilat   SLS 3x30\" Bilat   TrA x10 5\" hold   Bridging x10 with TrA (X)  SLR x10 Bilat with TrA (X)  Seated hip adduction 2x10 5\" hold   Seated hip abduction 2x10 Green Tband   Seated marches Green Tband 2x10     OP EDUCATION:   Access Code: [email or web address]  URL: [email or web address]  Date: 2024  Prepared by: Erin Baker    Exercises  - Supine Piriformis Stretch with Foot on Ground  - 1 x daily - 7 x weekly - 3 reps - 30 hold  - Supine Hip " "Adduction Isometric with Ball  - 1 x daily - 7 x weekly - 3 sets - 10 reps - 5 hold  - Hooklying Clamshell with Resistance  - 1 x daily - 7 x weekly - 3 sets - 10 reps  - Supine Single Knee to Chest Stretch  - 1 x daily - 7 x weekly - 3 sets - 10 reps  - Standing Hip Abduction  - 1 x daily - 7 x weekly - 3 sets - 10 reps  - Standing Hip Flexion with Counter Support  - 1 x daily - 7 x weekly - 3 sets - 10 reps  - Standing Hip Extension with Counter Support  - 1 x daily - 7 x weekly - 3 sets - 10 reps  - Supine Bridge  - 1 x daily - 7 x weekly - 3 sets - 10 reps  - Supine Straight Leg Raises  - 1 x daily - 7 x weekly - 3 sets - 10 reps    OP EDUCATION:  Pool exercise handout given    Goals:  Active       PT Problem       PT Goal 1       Start:  03/15/24    Expected End:  06/13/24       1. Independent HEP to allow for 50% reduction in max ADL C/C sx ( 8/10) 2-3wks 8/10 max sx within the last 5 days; 4/24/24; NOT MET  2. 0/10 night time sx to allow for uninterrupted sleep x 1wk ( 7/7-meds) 3-4wks no change-continues to use meds for sleep; 4/24/24; NOT MET  3. Strength increase to allow for improved ADL bending; lifting (from 4-/5 to 4+/5) 3-4wks 4/5 abdominals; slightly improved mobility; 4/24/24; PARTIALLY MET          PT Goal 2       Start:  03/15/24    Expected End:  06/13/24       1.\"I would like to improve my mobility\". \"There has been some improvements-it may have been the passage of time also\". 4/24/24; MET            "

## 2024-06-24 DIAGNOSIS — R79.89 LOW SERUM TESTOSTERONE LEVEL: ICD-10-CM

## 2024-06-24 DIAGNOSIS — R05.2 SUBACUTE COUGH: Primary | ICD-10-CM

## 2024-06-24 RX ORDER — TESTOSTERONE 20.25 MG/1.25G
2 GEL TOPICAL DAILY
Qty: 75 G | Refills: 3 | Status: SHIPPED | OUTPATIENT
Start: 2024-06-24 | End: 2024-07-24

## 2024-06-27 ENCOUNTER — HOSPITAL ENCOUNTER (OUTPATIENT)
Dept: RADIOLOGY | Facility: HOSPITAL | Age: 79
Discharge: HOME | End: 2024-06-27
Payer: MEDICARE

## 2024-06-27 DIAGNOSIS — R05.2 SUBACUTE COUGH: ICD-10-CM

## 2024-06-27 PROCEDURE — 71046 X-RAY EXAM CHEST 2 VIEWS: CPT | Performed by: RADIOLOGY

## 2024-06-27 PROCEDURE — 71046 X-RAY EXAM CHEST 2 VIEWS: CPT

## 2024-07-18 ENCOUNTER — LAB (OUTPATIENT)
Dept: LAB | Facility: LAB | Age: 79
End: 2024-07-18
Payer: MEDICARE

## 2024-07-18 DIAGNOSIS — E78.2 HYPERLIPEMIA, MIXED: ICD-10-CM

## 2024-07-18 DIAGNOSIS — Z12.5 SCREENING FOR PROSTATE CANCER: ICD-10-CM

## 2024-07-18 DIAGNOSIS — R79.89 LOW SERUM TESTOSTERONE LEVEL: ICD-10-CM

## 2024-07-18 DIAGNOSIS — I10 BENIGN ESSENTIAL HYPERTENSION: ICD-10-CM

## 2024-07-18 DIAGNOSIS — E29.1 HYPOGONADISM IN MALE: ICD-10-CM

## 2024-07-18 LAB
ALBUMIN SERPL BCP-MCNC: 4.5 G/DL (ref 3.4–5)
ALP SERPL-CCNC: 64 U/L (ref 33–136)
ALT SERPL W P-5'-P-CCNC: 11 U/L (ref 10–52)
ANION GAP SERPL CALC-SCNC: 10 MMOL/L (ref 10–20)
AST SERPL W P-5'-P-CCNC: 21 U/L (ref 9–39)
BASOPHILS # BLD AUTO: 0.02 X10*3/UL (ref 0–0.1)
BASOPHILS NFR BLD AUTO: 0.5 %
BILIRUB SERPL-MCNC: 0.5 MG/DL (ref 0–1.2)
BUN SERPL-MCNC: 20 MG/DL (ref 6–23)
CALCIUM SERPL-MCNC: 9.5 MG/DL (ref 8.6–10.3)
CHLORIDE SERPL-SCNC: 104 MMOL/L (ref 98–107)
CHOLEST SERPL-MCNC: 172 MG/DL (ref 0–199)
CHOLESTEROL/HDL RATIO: 3.4
CO2 SERPL-SCNC: 31 MMOL/L (ref 21–32)
CREAT SERPL-MCNC: 0.93 MG/DL (ref 0.5–1.3)
EGFRCR SERPLBLD CKD-EPI 2021: 82 ML/MIN/1.73M*2
EOSINOPHIL # BLD AUTO: 0.18 X10*3/UL (ref 0–0.4)
EOSINOPHIL NFR BLD AUTO: 4.5 %
ERYTHROCYTE [DISTWIDTH] IN BLOOD BY AUTOMATED COUNT: 17.2 % (ref 11.5–14.5)
GLUCOSE SERPL-MCNC: 99 MG/DL (ref 74–99)
HCT VFR BLD AUTO: 42.9 % (ref 41–52)
HDLC SERPL-MCNC: 51 MG/DL
HGB BLD-MCNC: 13.4 G/DL (ref 13.5–17.5)
IMM GRANULOCYTES # BLD AUTO: 0.01 X10*3/UL (ref 0–0.5)
IMM GRANULOCYTES NFR BLD AUTO: 0.3 % (ref 0–0.9)
LDLC SERPL CALC-MCNC: 96 MG/DL
LYMPHOCYTES # BLD AUTO: 1.23 X10*3/UL (ref 0.8–3)
LYMPHOCYTES NFR BLD AUTO: 30.9 %
MCH RBC QN AUTO: 27.4 PG (ref 26–34)
MCHC RBC AUTO-ENTMCNC: 31.2 G/DL (ref 32–36)
MCV RBC AUTO: 88 FL (ref 80–100)
MONOCYTES # BLD AUTO: 0.47 X10*3/UL (ref 0.05–0.8)
MONOCYTES NFR BLD AUTO: 11.8 %
NEUTROPHILS # BLD AUTO: 2.07 X10*3/UL (ref 1.6–5.5)
NEUTROPHILS NFR BLD AUTO: 52 %
NON HDL CHOLESTEROL: 121 MG/DL (ref 0–149)
NRBC BLD-RTO: 0 /100 WBCS (ref 0–0)
PLATELET # BLD AUTO: 196 X10*3/UL (ref 150–450)
POTASSIUM SERPL-SCNC: 4.5 MMOL/L (ref 3.5–5.3)
PROT SERPL-MCNC: 6.5 G/DL (ref 6.4–8.2)
PSA SERPL-MCNC: 1.06 NG/ML
RBC # BLD AUTO: 4.89 X10*6/UL (ref 4.5–5.9)
SODIUM SERPL-SCNC: 140 MMOL/L (ref 136–145)
TRIGL SERPL-MCNC: 124 MG/DL (ref 0–149)
VLDL: 25 MG/DL (ref 0–40)
WBC # BLD AUTO: 4 X10*3/UL (ref 4.4–11.3)

## 2024-07-18 PROCEDURE — 80053 COMPREHEN METABOLIC PANEL: CPT

## 2024-07-18 PROCEDURE — 36415 COLL VENOUS BLD VENIPUNCTURE: CPT

## 2024-07-18 PROCEDURE — 80061 LIPID PANEL: CPT

## 2024-07-18 PROCEDURE — 85025 COMPLETE CBC W/AUTO DIFF WBC: CPT

## 2024-07-18 PROCEDURE — 84402 ASSAY OF FREE TESTOSTERONE: CPT

## 2024-07-18 PROCEDURE — G0103 PSA SCREENING: HCPCS

## 2024-07-22 LAB
TESTOSTERONE FREE (CHAN): 6.2 PG/ML (ref 30–135)
TESTOSTERONE,TOTAL,LC-MS/MS: 51 NG/DL (ref 250–1100)

## 2024-07-26 ENCOUNTER — APPOINTMENT (OUTPATIENT)
Dept: PRIMARY CARE | Facility: CLINIC | Age: 79
End: 2024-07-26
Payer: MEDICARE

## 2024-07-26 VITALS
DIASTOLIC BLOOD PRESSURE: 70 MMHG | OXYGEN SATURATION: 96 % | HEART RATE: 70 BPM | SYSTOLIC BLOOD PRESSURE: 140 MMHG | BODY MASS INDEX: 24.4 KG/M2 | WEIGHT: 161 LBS | HEIGHT: 68 IN

## 2024-07-26 DIAGNOSIS — M54.17 LUMBOSACRAL RADICULOPATHY: ICD-10-CM

## 2024-07-26 DIAGNOSIS — G60.3 IDIOPATHIC PROGRESSIVE NEUROPATHY: ICD-10-CM

## 2024-07-26 DIAGNOSIS — F51.01 PRIMARY INSOMNIA: ICD-10-CM

## 2024-07-26 DIAGNOSIS — G89.29 OTHER CHRONIC PAIN: ICD-10-CM

## 2024-07-26 DIAGNOSIS — R11.0 NAUSEA: ICD-10-CM

## 2024-07-26 DIAGNOSIS — M79.605 LEG PAIN, BILATERAL: ICD-10-CM

## 2024-07-26 DIAGNOSIS — F19.20 CONTROLLED DRUG DEPENDENCE (MULTI): ICD-10-CM

## 2024-07-26 DIAGNOSIS — J45.20 MILD INTERMITTENT REACTIVE AIRWAY DISEASE WITHOUT COMPLICATION (HHS-HCC): ICD-10-CM

## 2024-07-26 DIAGNOSIS — R79.89 LOW SERUM TESTOSTERONE LEVEL: ICD-10-CM

## 2024-07-26 DIAGNOSIS — D64.9 ANEMIA, UNSPECIFIED TYPE: ICD-10-CM

## 2024-07-26 DIAGNOSIS — S06.5XAS: ICD-10-CM

## 2024-07-26 DIAGNOSIS — M79.604 LEG PAIN, BILATERAL: ICD-10-CM

## 2024-07-26 DIAGNOSIS — R06.09 DYSPNEA ON EXERTION: Primary | ICD-10-CM

## 2024-07-26 DIAGNOSIS — F41.9 ANXIETY: ICD-10-CM

## 2024-07-26 DIAGNOSIS — T78.40XS ALLERGY, SEQUELA: ICD-10-CM

## 2024-07-26 DIAGNOSIS — I10 BENIGN ESSENTIAL HYPERTENSION: ICD-10-CM

## 2024-07-26 DIAGNOSIS — E29.1 HYPOGONADISM IN MALE: ICD-10-CM

## 2024-07-26 DIAGNOSIS — R53.82 CHRONIC FATIGUE: ICD-10-CM

## 2024-07-26 DIAGNOSIS — K21.9 GASTROESOPHAGEAL REFLUX DISEASE WITHOUT ESOPHAGITIS: ICD-10-CM

## 2024-07-26 DIAGNOSIS — Z12.5 SCREENING FOR PROSTATE CANCER: ICD-10-CM

## 2024-07-26 PROBLEM — G45.9 TIA (TRANSIENT ISCHEMIC ATTACK): Status: RESOLVED | Noted: 2023-02-04 | Resolved: 2024-07-26

## 2024-07-26 PROBLEM — R42 DIZZINESS: Status: RESOLVED | Noted: 2023-02-04 | Resolved: 2024-07-26

## 2024-07-26 PROBLEM — R53.81 DEBILITY: Status: RESOLVED | Noted: 2023-02-04 | Resolved: 2024-07-26

## 2024-07-26 PROBLEM — R53.1 RIGHT SIDED WEAKNESS: Status: RESOLVED | Noted: 2023-02-04 | Resolved: 2024-07-26

## 2024-07-26 PROBLEM — R27.9 LACK OF COORDINATION: Status: RESOLVED | Noted: 2023-02-04 | Resolved: 2024-07-26

## 2024-07-26 PROBLEM — R26.89 BALANCE PROBLEMS: Status: RESOLVED | Noted: 2023-02-04 | Resolved: 2024-07-26

## 2024-07-26 PROBLEM — M25.50 ARTHRALGIA: Status: RESOLVED | Noted: 2023-02-04 | Resolved: 2024-07-26

## 2024-07-26 PROBLEM — R74.8 ELEVATED LIVER ENZYMES: Status: RESOLVED | Noted: 2023-02-04 | Resolved: 2024-07-26

## 2024-07-26 PROBLEM — M47.817 LUMBOSACRAL SPONDYLOSIS: Status: RESOLVED | Noted: 2023-08-29 | Resolved: 2024-07-26

## 2024-07-26 PROBLEM — R25.2 LEG CRAMPS: Status: RESOLVED | Noted: 2023-02-04 | Resolved: 2024-07-26

## 2024-07-26 PROBLEM — M70.62 TROCHANTERIC BURSITIS OF BOTH HIPS: Status: RESOLVED | Noted: 2023-08-29 | Resolved: 2024-07-26

## 2024-07-26 PROBLEM — M70.61 TROCHANTERIC BURSITIS OF BOTH HIPS: Status: RESOLVED | Noted: 2023-08-29 | Resolved: 2024-07-26

## 2024-07-26 PROBLEM — A69.20 LYME DISEASE: Status: RESOLVED | Noted: 2023-02-04 | Resolved: 2024-07-26

## 2024-07-26 LAB
AMPHETAMINES UR QL SCN: NORMAL
BARBITURATES UR QL SCN: NORMAL
BENZODIAZ UR QL SCN: NORMAL
BZE UR QL SCN: NORMAL
CANNABINOIDS UR QL SCN: NORMAL
FENTANYL+NORFENTANYL UR QL SCN: NORMAL
METHADONE UR QL SCN: NORMAL
OPIATES UR QL SCN: NORMAL
OXYCODONE+OXYMORPHONE UR QL SCN: NORMAL
PCP UR QL SCN: NORMAL

## 2024-07-26 PROCEDURE — 1159F MED LIST DOCD IN RCRD: CPT | Performed by: FAMILY MEDICINE

## 2024-07-26 PROCEDURE — 1036F TOBACCO NON-USER: CPT | Performed by: FAMILY MEDICINE

## 2024-07-26 PROCEDURE — 1157F ADVNC CARE PLAN IN RCRD: CPT | Performed by: FAMILY MEDICINE

## 2024-07-26 PROCEDURE — 3077F SYST BP >= 140 MM HG: CPT | Performed by: FAMILY MEDICINE

## 2024-07-26 PROCEDURE — 3078F DIAST BP <80 MM HG: CPT | Performed by: FAMILY MEDICINE

## 2024-07-26 PROCEDURE — 80307 DRUG TEST PRSMV CHEM ANLYZR: CPT

## 2024-07-26 PROCEDURE — 99214 OFFICE O/P EST MOD 30 MIN: CPT | Performed by: FAMILY MEDICINE

## 2024-07-26 PROCEDURE — 1160F RVW MEDS BY RX/DR IN RCRD: CPT | Performed by: FAMILY MEDICINE

## 2024-07-26 RX ORDER — LOSARTAN POTASSIUM AND HYDROCHLOROTHIAZIDE 25; 100 MG/1; MG/1
1 TABLET ORAL DAILY
Qty: 90 TABLET | Refills: 3 | Status: SHIPPED | OUTPATIENT
Start: 2024-07-26 | End: 2025-07-26

## 2024-07-26 RX ORDER — HYDROCODONE BITARTRATE AND ACETAMINOPHEN 5; 325 MG/1; MG/1
1 TABLET ORAL EVERY 4 HOURS PRN
Qty: 50 TABLET | Refills: 0 | Status: SHIPPED | OUTPATIENT
Start: 2024-07-26 | End: 2024-08-03

## 2024-07-26 RX ORDER — METOPROLOL SUCCINATE 50 MG/1
50 TABLET, EXTENDED RELEASE ORAL 2 TIMES DAILY
Qty: 180 TABLET | Refills: 3 | Status: SHIPPED | OUTPATIENT
Start: 2024-07-26 | End: 2025-07-26

## 2024-07-26 RX ORDER — ONDANSETRON 4 MG/1
4 TABLET, ORALLY DISINTEGRATING ORAL EVERY 6 HOURS PRN
Qty: 90 TABLET | Refills: 1 | Status: SHIPPED | OUTPATIENT
Start: 2024-07-26 | End: 2024-09-24

## 2024-07-26 RX ORDER — TESTOSTERONE 20.25 MG/1.25G
2 GEL TOPICAL DAILY
Qty: 75 G | Refills: 3 | Status: SHIPPED | OUTPATIENT
Start: 2024-07-26 | End: 2024-08-25

## 2024-07-26 ASSESSMENT — ENCOUNTER SYMPTOMS
HEADACHES: 0
ABDOMINAL DISTENTION: 0
DIARRHEA: 0
ARTHRALGIAS: 0
COUGH: 0
PALPITATIONS: 0
BACK PAIN: 1
VOMITING: 0
NUMBNESS: 1
RHINORRHEA: 0
ADENOPATHY: 0
APPETITE CHANGE: 0
DIZZINESS: 0
SHORTNESS OF BREATH: 1
WHEEZING: 0
NERVOUS/ANXIOUS: 0
ABDOMINAL PAIN: 0
LIGHT-HEADEDNESS: 0
WEAKNESS: 0
DYSPHORIC MOOD: 0
DIFFICULTY URINATING: 0
UNEXPECTED WEIGHT CHANGE: 0
ACTIVITY CHANGE: 0
FATIGUE: 1
NAUSEA: 0
SLEEP DISTURBANCE: 0
TROUBLE SWALLOWING: 0
CONSTIPATION: 0

## 2024-07-26 NOTE — ASSESSMENT & PLAN NOTE
Uses an occasional pain medication, hydrocodone was refilled today.  Controlled medicine agreement updated today along with urine drug testing, State prescribing database was also reviewed today.

## 2024-07-26 NOTE — ASSESSMENT & PLAN NOTE
Patient with some intermittent shortness of breath, will start by getting echocardiogram, uses albuterol only periodically, chest x-ray was normal, may benefit from pulmonary function testing.

## 2024-07-26 NOTE — PROGRESS NOTES
Subjective   Patient ID: Corey Freedman is a 79 y.o. male who presents for 3 MO LABS.    HPI   No headache, chest pain, shortness of breath, dizziness, lightheadedness, or edema  Gradually getting stronger and doing more, has follow-up with neurosurgery in September  Has not had to use pain medicine in over a week  Stiff and sore in AM  Does HEP/stretching, no falls in the past 3 months  N/T in feet, Left more than the right  HBP less than 140/90  Seen ENT for allergies, has a dry cough since May (CXR OK), some nasal congestion, no wheezing in the past month, no SOB, mild GUZMAN at times, has increased frequency of allergy shots over the past month  Had missed some doses  Fatigues easier      OARRS:  Mahendra Rossi MD on 7/26/2024 10:31 AM  I have personally reviewed the OARRS report for Corey Freedman. I have considered the risks of abuse, dependence, addiction and diversion    Is the patient prescribed a combination of a benzodiazepine and opioid?  No    Last Urine Drug Screen / ordered today: Yes  No results found for this or any previous visit (from the past 8760 hour(s)).  Results are as expected.     Clinical rationale for not completing a Urine Drug Screen: Was done a year ago      Controlled Substance Agreement:  Date of the Last Agreement: 7/6/23  Reviewed Controlled Substance Agreement including but not limited to the benefits, risks, and alternatives to treatment with a Controlled Substance medication(s).    Opioids:  What is the patient's goal of therapy? To help with pain  Is this being achieved with current treatment? yes    I have calculated the patient's Morphine Dose Equivalent (MED):   I have considered referral to Pain Management and/or a specialist, and do not feel it is necessary at this time.    I feel that it is clinically indicated to continue this current medication regimen after consideration of alternative therapies, and other non-opioid treatment.    Patient also on chronic  "testosterone replacement, labs UTD    Opioid Risk Screening:  No data recorded    Pain Assessment:  No data recorded    Review of Systems   Constitutional:  Positive for fatigue. Negative for activity change, appetite change and unexpected weight change.   HENT:  Negative for ear pain, nosebleeds, rhinorrhea, sneezing and trouble swallowing.    Respiratory:  Positive for shortness of breath. Negative for cough and wheezing.    Cardiovascular:  Negative for chest pain, palpitations and leg swelling.   Gastrointestinal:  Negative for abdominal distention, abdominal pain, constipation, diarrhea, nausea and vomiting.   Genitourinary:  Negative for difficulty urinating.   Musculoskeletal:  Positive for back pain. Negative for arthralgias and gait problem.   Skin:  Negative for rash.   Neurological:  Positive for numbness. Negative for dizziness, weakness, light-headedness and headaches.   Hematological:  Negative for adenopathy.   Psychiatric/Behavioral:  Negative for behavioral problems, dysphoric mood and sleep disturbance. The patient is not nervous/anxious.    All other systems reviewed and are negative.      Objective   /70   Pulse 70   Ht 1.727 m (5' 8\")   Wt 73 kg (161 lb)   SpO2 96%   BMI 24.48 kg/m²     Physical Exam  Vitals and nursing note reviewed.   Constitutional:       General: He is not in acute distress.     Appearance: Normal appearance. He is not toxic-appearing.   HENT:      Head: Normocephalic and atraumatic.      Right Ear: Tympanic membrane, ear canal and external ear normal.      Left Ear: Tympanic membrane, ear canal and external ear normal.      Nose: Nose normal.      Mouth/Throat:      Mouth: Mucous membranes are moist.      Pharynx: Oropharynx is clear.   Eyes:      Extraocular Movements: Extraocular movements intact.      Conjunctiva/sclera: Conjunctivae normal.      Pupils: Pupils are equal, round, and reactive to light.   Cardiovascular:      Rate and Rhythm: Normal rate and " regular rhythm.      Pulses: Normal pulses.      Heart sounds: Normal heart sounds.   Pulmonary:      Effort: Pulmonary effort is normal.      Breath sounds: Normal breath sounds.   Abdominal:      General: Abdomen is flat. Bowel sounds are normal.      Palpations: Abdomen is soft.   Musculoskeletal:      Cervical back: Normal range of motion and neck supple.   Skin:     General: Skin is warm and dry.      Capillary Refill: Capillary refill takes less than 2 seconds.   Neurological:      General: No focal deficit present.      Mental Status: He is alert and oriented to person, place, and time. Mental status is at baseline.   Psychiatric:         Mood and Affect: Mood normal.         Behavior: Behavior normal.         Assessment/Plan   Problem List Items Addressed This Visit             ICD-10-CM    Allergies T78.40XA     Seen ENT physician recently, is on allergy shots and medications.         Relevant Orders    Follow Up In Primary Care - Established    Anxiety F41.9     Seems to be doing okay with buspirone.         Relevant Orders    Follow Up In Primary Care - Established    Benign essential hypertension I10     Blood pressure seems to be stable, renal and electrolytes are stable, no change         Relevant Medications    losartan-hydrochlorothiazide (Hyzaar) 100-25 mg tablet    metoprolol succinate XL (Toprol-XL) 50 mg 24 hr tablet    Other Relevant Orders    Follow Up In Primary Care - Established    Comprehensive Metabolic Panel    Lumbosacral radiculopathy M54.17     Uses an occasional pain medication, hydrocodone was refilled today.  Controlled medicine agreement updated today along with urine drug testing, State prescribing database was also reviewed today.         Relevant Medications    HYDROcodone-acetaminophen (Norco) 5-325 mg tablet    Other Relevant Orders    Follow Up In Primary Care - Established    Chronic pain G89.29    Relevant Orders    Follow Up In Primary Care - Established    RESOLVED: GERD  (gastroesophageal reflux disease) K21.9    Relevant Orders    Follow Up In Primary Care - Established    Hypogonadism in male E29.1     Testosterone level low, instructed to use as directed with plan to recheck at follow-up.         Relevant Orders    Follow Up In Primary Care - Established    CBC and Auto Differential    Comprehensive Metabolic Panel    Testosterone,Free and Total    Insomnia G47.00    Relevant Orders    Follow Up In Primary Care - Established    Peripheral neuropathy G62.9    Relevant Medications    HYDROcodone-acetaminophen (Norco) 5-325 mg tablet    Other Relevant Orders    Follow Up In Primary Care - Established    CBC and Auto Differential    Comprehensive Metabolic Panel    Vitamin B12    TSH with reflex to Free T4 if abnormal    RAD (reactive airway disease) ([facility]) J45.909     Patient with some intermittent shortness of breath, will start by getting echocardiogram, uses albuterol only periodically, chest x-ray was normal, may benefit from pulmonary function testing.         Relevant Orders    Follow Up In Primary Care - Established     Other Visit Diagnoses         Codes    Dyspnea on exertion    -  Primary R06.09    Relevant Orders    Follow Up In Primary Care - Established    Transthoracic Echo Complete    CBC and Auto Differential    Comprehensive Metabolic Panel    Leg pain, bilateral     M79.604, M79.605    Relevant Orders    Follow Up In Primary Care - Established    DRUG SCREEN,URINE    Post-traumatic subdural hematoma, with unknown loss of consciousness status, sequela (CMS-HCC)     S06.5XAS    Relevant Orders    Follow Up In Primary Care - Established    Low serum testosterone level     R79.89    Relevant Medications    testosterone 20.25 mg/1.25 gram (1.62 %) gel in metered-dose pump    Other Relevant Orders    Follow Up In Primary Care - Established    CBC and Auto Differential    Comprehensive Metabolic Panel    Testosterone,Free and Total    DRUG SCREEN,URINE    Controlled  drug dependence (Multi)     F19.20    Relevant Orders    Follow Up In Primary Care - Established    DRUG SCREEN,URINE    Screening for prostate cancer     Z12.5    Relevant Orders    Follow Up In Primary Care - Established    Chronic fatigue     R53.82    Relevant Orders    Follow Up In Primary Care - Established    CBC and Auto Differential    Comprehensive Metabolic Panel    Vitamin B12    TSH with reflex to Free T4 if abnormal    Testosterone,Free and Total    Ferritin    Nausea     R11.0    Relevant Medications    ondansetron ODT (Zofran-ODT) 4 mg disintegrating tablet    Anemia, unspecified type     D64.9    Relevant Orders    Ferritin

## 2024-07-29 DIAGNOSIS — N40.1 BENIGN PROSTATIC HYPERPLASIA WITH NOCTURIA: Primary | ICD-10-CM

## 2024-07-29 DIAGNOSIS — R35.1 BENIGN PROSTATIC HYPERPLASIA WITH NOCTURIA: Primary | ICD-10-CM

## 2024-07-29 RX ORDER — TAMSULOSIN HYDROCHLORIDE 0.4 MG/1
0.4 CAPSULE ORAL DAILY
COMMUNITY
End: 2024-07-29 | Stop reason: SDUPTHER

## 2024-07-29 RX ORDER — TAMSULOSIN HYDROCHLORIDE 0.4 MG/1
0.4 CAPSULE ORAL DAILY
Qty: 90 CAPSULE | Refills: 3 | Status: SHIPPED | OUTPATIENT
Start: 2024-07-29 | End: 2025-07-29

## 2024-08-08 ENCOUNTER — HOSPITAL ENCOUNTER (OUTPATIENT)
Dept: CARDIOLOGY | Facility: HOSPITAL | Age: 79
Discharge: HOME | End: 2024-08-08
Payer: MEDICARE

## 2024-08-08 DIAGNOSIS — I50.20 UNSPECIFIED SYSTOLIC (CONGESTIVE) HEART FAILURE (MULTI): ICD-10-CM

## 2024-08-08 DIAGNOSIS — R06.09 DYSPNEA ON EXERTION: ICD-10-CM

## 2024-08-08 LAB
AORTIC VALVE MEAN GRADIENT: 7 MMHG
AORTIC VALVE PEAK VELOCITY: 1.71 M/S
AV PEAK GRADIENT: 11.7 MMHG
AVA (PEAK VEL): 2.09 CM2
AVA (VTI): 2.17 CM2
EJECTION FRACTION APICAL 4 CHAMBER: 63.4
EJECTION FRACTION: 63 %
LEFT ATRIUM VOLUME AREA LENGTH INDEX BSA: 16.8 ML/M2
LEFT VENTRICLE INTERNAL DIMENSION DIASTOLE: 3.79 CM (ref 3.5–6)
LEFT VENTRICULAR OUTFLOW TRACT DIAMETER: 2 CM
LV EJECTION FRACTION BIPLANE: 63 %
MITRAL VALVE E/A RATIO: 0.55
RIGHT VENTRICLE FREE WALL PEAK S': 11 CM/S
TRICUSPID ANNULAR PLANE SYSTOLIC EXCURSION: 1.7 CM

## 2024-08-08 PROCEDURE — 93306 TTE W/DOPPLER COMPLETE: CPT | Performed by: STUDENT IN AN ORGANIZED HEALTH CARE EDUCATION/TRAINING PROGRAM

## 2024-08-08 PROCEDURE — 93306 TTE W/DOPPLER COMPLETE: CPT

## 2024-08-29 DIAGNOSIS — R79.89 LOW SERUM TESTOSTERONE LEVEL: ICD-10-CM

## 2024-08-29 RX ORDER — TESTOSTERONE 20.25 MG/1.25G
2 GEL TOPICAL DAILY
Qty: 75 G | Refills: 0 | Status: SHIPPED | OUTPATIENT
Start: 2024-08-29 | End: 2024-09-28

## 2024-08-29 RX ORDER — TESTOSTERONE 20.25 MG/1.25G
2 GEL TOPICAL DAILY
Qty: 75 G | Refills: 3 | Status: SHIPPED | OUTPATIENT
Start: 2024-08-29 | End: 2024-08-29 | Stop reason: SDUPTHER

## 2024-09-23 DIAGNOSIS — I10 BENIGN ESSENTIAL HYPERTENSION: ICD-10-CM

## 2024-09-23 RX ORDER — METOPROLOL SUCCINATE 50 MG/1
50 TABLET, EXTENDED RELEASE ORAL 2 TIMES DAILY
Qty: 180 TABLET | Refills: 3 | Status: SHIPPED | OUTPATIENT
Start: 2024-09-23 | End: 2025-09-23

## 2024-09-23 NOTE — TELEPHONE ENCOUNTER
Patient called in requesting a new prescription for metoprolol 50mg. Stated he will be out of medication before his appointment in October.

## 2024-09-24 ENCOUNTER — TELEPHONE (OUTPATIENT)
Age: 79
End: 2024-09-24
Payer: MEDICARE

## 2024-09-24 DIAGNOSIS — R79.89 LOW SERUM TESTOSTERONE LEVEL: ICD-10-CM

## 2024-09-24 RX ORDER — TESTOSTERONE 20.25 MG/1.25G
2 GEL TOPICAL DAILY
Qty: 75 G | Refills: 2 | Status: SHIPPED | OUTPATIENT
Start: 2024-09-24 | End: 2024-10-24

## 2024-10-24 ENCOUNTER — LAB (OUTPATIENT)
Dept: LAB | Facility: LAB | Age: 79
End: 2024-10-24
Payer: MEDICARE

## 2024-10-24 DIAGNOSIS — R79.89 LOW SERUM TESTOSTERONE LEVEL: ICD-10-CM

## 2024-10-24 DIAGNOSIS — D64.9 ANEMIA, UNSPECIFIED TYPE: ICD-10-CM

## 2024-10-24 DIAGNOSIS — E29.1 HYPOGONADISM IN MALE: ICD-10-CM

## 2024-10-24 DIAGNOSIS — I10 BENIGN ESSENTIAL HYPERTENSION: ICD-10-CM

## 2024-10-24 DIAGNOSIS — R53.82 CHRONIC FATIGUE: ICD-10-CM

## 2024-10-24 DIAGNOSIS — R06.09 DYSPNEA ON EXERTION: ICD-10-CM

## 2024-10-24 DIAGNOSIS — G60.3 IDIOPATHIC PROGRESSIVE NEUROPATHY: ICD-10-CM

## 2024-10-24 LAB
ALBUMIN SERPL BCP-MCNC: 4.5 G/DL (ref 3.4–5)
ALP SERPL-CCNC: 75 U/L (ref 33–136)
ALT SERPL W P-5'-P-CCNC: 14 U/L (ref 10–52)
ANION GAP SERPL CALC-SCNC: 11 MMOL/L (ref 10–20)
AST SERPL W P-5'-P-CCNC: 25 U/L (ref 9–39)
BASOPHILS # BLD AUTO: 0.02 X10*3/UL (ref 0–0.1)
BASOPHILS NFR BLD AUTO: 0.4 %
BILIRUB SERPL-MCNC: 0.6 MG/DL (ref 0–1.2)
BUN SERPL-MCNC: 18 MG/DL (ref 6–23)
CALCIUM SERPL-MCNC: 9.6 MG/DL (ref 8.6–10.3)
CHLORIDE SERPL-SCNC: 103 MMOL/L (ref 98–107)
CO2 SERPL-SCNC: 30 MMOL/L (ref 21–32)
CREAT SERPL-MCNC: 0.99 MG/DL (ref 0.5–1.3)
EGFRCR SERPLBLD CKD-EPI 2021: 77 ML/MIN/1.73M*2
EOSINOPHIL # BLD AUTO: 0.17 X10*3/UL (ref 0–0.4)
EOSINOPHIL NFR BLD AUTO: 3.3 %
ERYTHROCYTE [DISTWIDTH] IN BLOOD BY AUTOMATED COUNT: 17.2 % (ref 11.5–14.5)
FERRITIN SERPL-MCNC: 25 NG/ML (ref 20–300)
GLUCOSE SERPL-MCNC: 91 MG/DL (ref 74–99)
HCT VFR BLD AUTO: 46 % (ref 41–52)
HGB BLD-MCNC: 14.4 G/DL (ref 13.5–17.5)
IMM GRANULOCYTES # BLD AUTO: 0.01 X10*3/UL (ref 0–0.5)
IMM GRANULOCYTES NFR BLD AUTO: 0.2 % (ref 0–0.9)
LYMPHOCYTES # BLD AUTO: 1.67 X10*3/UL (ref 0.8–3)
LYMPHOCYTES NFR BLD AUTO: 32.1 %
MCH RBC QN AUTO: 26.7 PG (ref 26–34)
MCHC RBC AUTO-ENTMCNC: 31.3 G/DL (ref 32–36)
MCV RBC AUTO: 85 FL (ref 80–100)
MONOCYTES # BLD AUTO: 0.59 X10*3/UL (ref 0.05–0.8)
MONOCYTES NFR BLD AUTO: 11.3 %
NEUTROPHILS # BLD AUTO: 2.75 X10*3/UL (ref 1.6–5.5)
NEUTROPHILS NFR BLD AUTO: 52.7 %
NRBC BLD-RTO: 0 /100 WBCS (ref 0–0)
PLATELET # BLD AUTO: 204 X10*3/UL (ref 150–450)
POTASSIUM SERPL-SCNC: 4.2 MMOL/L (ref 3.5–5.3)
PROT SERPL-MCNC: 6.9 G/DL (ref 6.4–8.2)
RBC # BLD AUTO: 5.39 X10*6/UL (ref 4.5–5.9)
SODIUM SERPL-SCNC: 140 MMOL/L (ref 136–145)
TSH SERPL-ACNC: 2.15 MIU/L (ref 0.44–3.98)
VIT B12 SERPL-MCNC: 1193 PG/ML (ref 211–911)
WBC # BLD AUTO: 5.2 X10*3/UL (ref 4.4–11.3)

## 2024-10-24 PROCEDURE — 82607 VITAMIN B-12: CPT

## 2024-10-24 PROCEDURE — 80053 COMPREHEN METABOLIC PANEL: CPT

## 2024-10-24 PROCEDURE — 82728 ASSAY OF FERRITIN: CPT

## 2024-10-24 PROCEDURE — 84443 ASSAY THYROID STIM HORMONE: CPT

## 2024-10-24 PROCEDURE — 85025 COMPLETE CBC W/AUTO DIFF WBC: CPT

## 2024-10-29 ENCOUNTER — APPOINTMENT (OUTPATIENT)
Age: 79
End: 2024-10-29
Payer: MEDICARE

## 2024-10-29 VITALS
BODY MASS INDEX: 24.43 KG/M2 | WEIGHT: 161.2 LBS | DIASTOLIC BLOOD PRESSURE: 78 MMHG | HEART RATE: 63 BPM | HEIGHT: 68 IN | OXYGEN SATURATION: 97 % | SYSTOLIC BLOOD PRESSURE: 120 MMHG

## 2024-10-29 DIAGNOSIS — J45.20 MILD INTERMITTENT REACTIVE AIRWAY DISEASE WITHOUT COMPLICATION (HHS-HCC): ICD-10-CM

## 2024-10-29 DIAGNOSIS — K21.9 GASTROESOPHAGEAL REFLUX DISEASE WITHOUT ESOPHAGITIS: ICD-10-CM

## 2024-10-29 DIAGNOSIS — F19.20 CONTROLLED DRUG DEPENDENCE (MULTI): ICD-10-CM

## 2024-10-29 DIAGNOSIS — M79.605 LEG PAIN, BILATERAL: ICD-10-CM

## 2024-10-29 DIAGNOSIS — F41.9 ANXIETY: ICD-10-CM

## 2024-10-29 DIAGNOSIS — I10 BENIGN ESSENTIAL HYPERTENSION: ICD-10-CM

## 2024-10-29 DIAGNOSIS — R06.09 DYSPNEA ON EXERTION: ICD-10-CM

## 2024-10-29 DIAGNOSIS — G89.29 OTHER CHRONIC PAIN: ICD-10-CM

## 2024-10-29 DIAGNOSIS — E29.1 HYPOGONADISM IN MALE: ICD-10-CM

## 2024-10-29 DIAGNOSIS — R79.89 LOW SERUM TESTOSTERONE LEVEL: Primary | ICD-10-CM

## 2024-10-29 DIAGNOSIS — I49.3 PVC (PREMATURE VENTRICULAR CONTRACTION): ICD-10-CM

## 2024-10-29 DIAGNOSIS — M54.17 LUMBOSACRAL RADICULOPATHY: ICD-10-CM

## 2024-10-29 DIAGNOSIS — Z12.5 SCREENING FOR PROSTATE CANCER: ICD-10-CM

## 2024-10-29 DIAGNOSIS — M79.604 LEG PAIN, BILATERAL: ICD-10-CM

## 2024-10-29 DIAGNOSIS — F51.01 PRIMARY INSOMNIA: ICD-10-CM

## 2024-10-29 DIAGNOSIS — S06.5XAS: ICD-10-CM

## 2024-10-29 DIAGNOSIS — G60.3 IDIOPATHIC PROGRESSIVE NEUROPATHY: ICD-10-CM

## 2024-10-29 DIAGNOSIS — T78.40XS ALLERGY, SEQUELA: ICD-10-CM

## 2024-10-29 DIAGNOSIS — R53.82 CHRONIC FATIGUE: ICD-10-CM

## 2024-10-29 PROBLEM — R13.10 ODYNOPHAGIA: Status: RESOLVED | Noted: 2023-02-04 | Resolved: 2024-10-29

## 2024-10-29 PROCEDURE — 3074F SYST BP LT 130 MM HG: CPT | Performed by: FAMILY MEDICINE

## 2024-10-29 PROCEDURE — 1157F ADVNC CARE PLAN IN RCRD: CPT | Performed by: FAMILY MEDICINE

## 2024-10-29 PROCEDURE — G2211 COMPLEX E/M VISIT ADD ON: HCPCS | Performed by: FAMILY MEDICINE

## 2024-10-29 PROCEDURE — 3078F DIAST BP <80 MM HG: CPT | Performed by: FAMILY MEDICINE

## 2024-10-29 PROCEDURE — 1160F RVW MEDS BY RX/DR IN RCRD: CPT | Performed by: FAMILY MEDICINE

## 2024-10-29 PROCEDURE — 99214 OFFICE O/P EST MOD 30 MIN: CPT | Performed by: FAMILY MEDICINE

## 2024-10-29 PROCEDURE — 1159F MED LIST DOCD IN RCRD: CPT | Performed by: FAMILY MEDICINE

## 2024-10-29 PROCEDURE — 1036F TOBACCO NON-USER: CPT | Performed by: FAMILY MEDICINE

## 2024-10-29 RX ORDER — HYDROCODONE BITARTRATE AND ACETAMINOPHEN 5; 325 MG/1; MG/1
1 TABLET ORAL EVERY 6 HOURS PRN
Qty: 50 TABLET | Refills: 0 | Status: SHIPPED | OUTPATIENT
Start: 2024-10-29 | End: 2024-11-05

## 2024-10-29 RX ORDER — TESTOSTERONE 20.25 MG/1.25G
2 GEL TOPICAL DAILY
Qty: 75 G | Refills: 2 | Status: SHIPPED | OUTPATIENT
Start: 2024-10-29 | End: 2024-11-28

## 2024-10-29 ASSESSMENT — ENCOUNTER SYMPTOMS
PALPITATIONS: 0
NECK PAIN: 1
SHORTNESS OF BREATH: 0
DIZZINESS: 0
LIGHT-HEADEDNESS: 0
ACTIVITY CHANGE: 0
NERVOUS/ANXIOUS: 0
CONSTIPATION: 0
VOMITING: 0
DIARRHEA: 0
NUMBNESS: 0
UNEXPECTED WEIGHT CHANGE: 0
SLEEP DISTURBANCE: 0
APPETITE CHANGE: 0
ABDOMINAL PAIN: 0
HEADACHES: 0
ARTHRALGIAS: 0
DYSPHORIC MOOD: 0
WEAKNESS: 1
NAUSEA: 0
COUGH: 0
RHINORRHEA: 0
ABDOMINAL DISTENTION: 0
DIFFICULTY URINATING: 0
WHEEZING: 0
BACK PAIN: 1
ADENOPATHY: 0
FATIGUE: 0
TROUBLE SWALLOWING: 0

## 2024-11-01 LAB
TESTOSTERONE FREE (CHAN): 137.1 PG/ML (ref 30–135)
TESTOSTERONE,TOTAL,LC-MS/MS: 952 NG/DL (ref 250–1100)

## 2025-01-03 DIAGNOSIS — G60.3 IDIOPATHIC PROGRESSIVE NEUROPATHY: ICD-10-CM

## 2025-01-03 DIAGNOSIS — G89.29 OTHER CHRONIC PAIN: ICD-10-CM

## 2025-01-03 RX ORDER — PREGABALIN 75 MG/1
75 CAPSULE ORAL 2 TIMES DAILY
Qty: 60 CAPSULE | Refills: 0 | Status: SHIPPED | OUTPATIENT
Start: 2025-01-03 | End: 2025-12-29

## 2025-01-21 ENCOUNTER — TELEPHONE (OUTPATIENT)
Age: 80
End: 2025-01-21
Payer: MEDICARE

## 2025-01-21 DIAGNOSIS — E29.1 HYPOGONADISM IN MALE: ICD-10-CM

## 2025-01-21 DIAGNOSIS — R79.89 LOW SERUM TESTOSTERONE LEVEL: ICD-10-CM

## 2025-01-21 DIAGNOSIS — K22.70 BARRETT'S ESOPHAGUS WITHOUT DYSPLASIA: ICD-10-CM

## 2025-01-21 DIAGNOSIS — Z12.11 COLON CANCER SCREENING: ICD-10-CM

## 2025-01-21 DIAGNOSIS — I10 BENIGN ESSENTIAL HYPERTENSION: Primary | ICD-10-CM

## 2025-01-21 RX ORDER — TESTOSTERONE 20.25 MG/1.25G
2 GEL TOPICAL DAILY
Qty: 75 G | Refills: 2 | Status: SHIPPED | OUTPATIENT
Start: 2025-01-21 | End: 2025-02-20

## 2025-01-24 ENCOUNTER — LAB (OUTPATIENT)
Facility: LAB | Age: 80
End: 2025-01-24
Payer: MEDICARE

## 2025-01-24 DIAGNOSIS — E29.1 HYPOGONADISM IN MALE: ICD-10-CM

## 2025-01-24 DIAGNOSIS — I10 BENIGN ESSENTIAL HYPERTENSION: ICD-10-CM

## 2025-01-24 LAB
ALBUMIN SERPL BCP-MCNC: 4.7 G/DL (ref 3.4–5)
ALP SERPL-CCNC: 69 U/L (ref 33–136)
ALT SERPL W P-5'-P-CCNC: 15 U/L (ref 10–52)
ANION GAP SERPL CALC-SCNC: 12 MMOL/L (ref 10–20)
AST SERPL W P-5'-P-CCNC: 23 U/L (ref 9–39)
BASOPHILS # BLD AUTO: 0.02 X10*3/UL (ref 0–0.1)
BASOPHILS NFR BLD AUTO: 0.4 %
BILIRUB SERPL-MCNC: 0.4 MG/DL (ref 0–1.2)
BUN SERPL-MCNC: 19 MG/DL (ref 6–23)
CALCIUM SERPL-MCNC: 10.1 MG/DL (ref 8.6–10.3)
CHLORIDE SERPL-SCNC: 103 MMOL/L (ref 98–107)
CO2 SERPL-SCNC: 29 MMOL/L (ref 21–32)
CREAT SERPL-MCNC: 1.14 MG/DL (ref 0.5–1.3)
EGFRCR SERPLBLD CKD-EPI 2021: 65 ML/MIN/1.73M*2
EOSINOPHIL # BLD AUTO: 0.14 X10*3/UL (ref 0–0.4)
EOSINOPHIL NFR BLD AUTO: 2.7 %
ERYTHROCYTE [DISTWIDTH] IN BLOOD BY AUTOMATED COUNT: 17.9 % (ref 11.5–14.5)
GLUCOSE SERPL-MCNC: 80 MG/DL (ref 74–99)
HCT VFR BLD AUTO: 47.1 % (ref 41–52)
HGB BLD-MCNC: 14.8 G/DL (ref 13.5–17.5)
IMM GRANULOCYTES # BLD AUTO: 0.01 X10*3/UL (ref 0–0.5)
IMM GRANULOCYTES NFR BLD AUTO: 0.2 % (ref 0–0.9)
LYMPHOCYTES # BLD AUTO: 1.4 X10*3/UL (ref 0.8–3)
LYMPHOCYTES NFR BLD AUTO: 27.3 %
MCH RBC QN AUTO: 27.1 PG (ref 26–34)
MCHC RBC AUTO-ENTMCNC: 31.4 G/DL (ref 32–36)
MCV RBC AUTO: 86 FL (ref 80–100)
MONOCYTES # BLD AUTO: 0.65 X10*3/UL (ref 0.05–0.8)
MONOCYTES NFR BLD AUTO: 12.7 %
NEUTROPHILS # BLD AUTO: 2.91 X10*3/UL (ref 1.6–5.5)
NEUTROPHILS NFR BLD AUTO: 56.7 %
NRBC BLD-RTO: 0 /100 WBCS (ref 0–0)
PLATELET # BLD AUTO: 224 X10*3/UL (ref 150–450)
POTASSIUM SERPL-SCNC: 4.6 MMOL/L (ref 3.5–5.3)
PROT SERPL-MCNC: 7 G/DL (ref 6.4–8.2)
RBC # BLD AUTO: 5.47 X10*6/UL (ref 4.5–5.9)
SODIUM SERPL-SCNC: 139 MMOL/L (ref 136–145)
WBC # BLD AUTO: 5.1 X10*3/UL (ref 4.4–11.3)

## 2025-01-24 PROCEDURE — 36415 COLL VENOUS BLD VENIPUNCTURE: CPT

## 2025-01-24 PROCEDURE — 85025 COMPLETE CBC W/AUTO DIFF WBC: CPT

## 2025-01-24 PROCEDURE — 80053 COMPREHEN METABOLIC PANEL: CPT

## 2025-01-24 PROCEDURE — 84402 ASSAY OF FREE TESTOSTERONE: CPT

## 2025-01-29 ENCOUNTER — APPOINTMENT (OUTPATIENT)
Age: 80
End: 2025-01-29
Payer: MEDICARE

## 2025-01-29 VITALS
SYSTOLIC BLOOD PRESSURE: 130 MMHG | DIASTOLIC BLOOD PRESSURE: 80 MMHG | HEIGHT: 68 IN | BODY MASS INDEX: 24.58 KG/M2 | WEIGHT: 162.2 LBS | OXYGEN SATURATION: 95 % | HEART RATE: 65 BPM

## 2025-01-29 DIAGNOSIS — M54.17 LUMBOSACRAL RADICULOPATHY: ICD-10-CM

## 2025-01-29 DIAGNOSIS — Z12.5 SCREENING FOR PROSTATE CANCER: ICD-10-CM

## 2025-01-29 DIAGNOSIS — M79.605 LEG PAIN, BILATERAL: ICD-10-CM

## 2025-01-29 DIAGNOSIS — F51.01 PRIMARY INSOMNIA: ICD-10-CM

## 2025-01-29 DIAGNOSIS — F19.20 CONTROLLED DRUG DEPENDENCE (MULTI): ICD-10-CM

## 2025-01-29 DIAGNOSIS — G60.3 IDIOPATHIC PROGRESSIVE NEUROPATHY: ICD-10-CM

## 2025-01-29 DIAGNOSIS — R11.0 NAUSEA: ICD-10-CM

## 2025-01-29 DIAGNOSIS — E29.1 HYPOGONADISM IN MALE: ICD-10-CM

## 2025-01-29 DIAGNOSIS — S06.5XAS: ICD-10-CM

## 2025-01-29 DIAGNOSIS — J45.20 MILD INTERMITTENT REACTIVE AIRWAY DISEASE WITHOUT COMPLICATION (HHS-HCC): ICD-10-CM

## 2025-01-29 DIAGNOSIS — R79.89 LOW SERUM TESTOSTERONE LEVEL: ICD-10-CM

## 2025-01-29 DIAGNOSIS — T78.40XS ALLERGY, SEQUELA: ICD-10-CM

## 2025-01-29 DIAGNOSIS — K21.9 GASTROESOPHAGEAL REFLUX DISEASE WITHOUT ESOPHAGITIS: ICD-10-CM

## 2025-01-29 DIAGNOSIS — I10 BENIGN ESSENTIAL HYPERTENSION: ICD-10-CM

## 2025-01-29 DIAGNOSIS — E78.2 MIXED HYPERLIPIDEMIA: ICD-10-CM

## 2025-01-29 DIAGNOSIS — Z00.00 ROUTINE GENERAL MEDICAL EXAMINATION AT HEALTH CARE FACILITY: Primary | ICD-10-CM

## 2025-01-29 DIAGNOSIS — G89.29 OTHER CHRONIC PAIN: ICD-10-CM

## 2025-01-29 DIAGNOSIS — F41.9 ANXIETY: ICD-10-CM

## 2025-01-29 DIAGNOSIS — M79.604 LEG PAIN, BILATERAL: ICD-10-CM

## 2025-01-29 PROCEDURE — G0439 PPPS, SUBSEQ VISIT: HCPCS | Performed by: FAMILY MEDICINE

## 2025-01-29 PROCEDURE — 1159F MED LIST DOCD IN RCRD: CPT | Performed by: FAMILY MEDICINE

## 2025-01-29 PROCEDURE — 1158F ADVNC CARE PLAN TLK DOCD: CPT | Performed by: FAMILY MEDICINE

## 2025-01-29 PROCEDURE — 1036F TOBACCO NON-USER: CPT | Performed by: FAMILY MEDICINE

## 2025-01-29 PROCEDURE — 99214 OFFICE O/P EST MOD 30 MIN: CPT | Performed by: FAMILY MEDICINE

## 2025-01-29 PROCEDURE — 1123F ACP DISCUSS/DSCN MKR DOCD: CPT | Performed by: FAMILY MEDICINE

## 2025-01-29 PROCEDURE — 3075F SYST BP GE 130 - 139MM HG: CPT | Performed by: FAMILY MEDICINE

## 2025-01-29 PROCEDURE — 1160F RVW MEDS BY RX/DR IN RCRD: CPT | Performed by: FAMILY MEDICINE

## 2025-01-29 PROCEDURE — 1157F ADVNC CARE PLAN IN RCRD: CPT | Performed by: FAMILY MEDICINE

## 2025-01-29 PROCEDURE — 3079F DIAST BP 80-89 MM HG: CPT | Performed by: FAMILY MEDICINE

## 2025-01-29 PROCEDURE — 1170F FXNL STATUS ASSESSED: CPT | Performed by: FAMILY MEDICINE

## 2025-01-29 RX ORDER — BUSPIRONE HYDROCHLORIDE 5 MG/1
5 TABLET ORAL 3 TIMES DAILY PRN
Qty: 90 TABLET | Refills: 11 | Status: SHIPPED | OUTPATIENT
Start: 2025-01-29 | End: 2026-01-29

## 2025-01-29 RX ORDER — MONTELUKAST SODIUM 10 MG/1
10 TABLET ORAL NIGHTLY
Qty: 90 TABLET | Refills: 3 | Status: SHIPPED | OUTPATIENT
Start: 2025-01-29 | End: 2026-01-29

## 2025-01-29 RX ORDER — ONDANSETRON 4 MG/1
4 TABLET, ORALLY DISINTEGRATING ORAL EVERY 6 HOURS PRN
Qty: 90 TABLET | Refills: 1 | Status: SHIPPED | OUTPATIENT
Start: 2025-01-29 | End: 2025-03-30

## 2025-01-29 RX ORDER — ONDANSETRON 4 MG/1
4 TABLET, ORALLY DISINTEGRATING ORAL EVERY 6 HOURS PRN
COMMUNITY
Start: 2024-10-29 | End: 2025-01-29 | Stop reason: SDUPTHER

## 2025-01-29 RX ORDER — HYDROCODONE BITARTRATE AND ACETAMINOPHEN 5; 325 MG/1; MG/1
1 TABLET ORAL EVERY 6 HOURS PRN
Qty: 50 TABLET | Refills: 0 | Status: SHIPPED | OUTPATIENT
Start: 2025-01-29 | End: 2025-02-11

## 2025-01-29 ASSESSMENT — ENCOUNTER SYMPTOMS
OCCASIONAL FEELINGS OF UNSTEADINESS: 0
WHEEZING: 0
DIZZINESS: 0
FATIGUE: 0
ABDOMINAL PAIN: 0
TROUBLE SWALLOWING: 0
CONSTIPATION: 0
ABDOMINAL DISTENTION: 0
ARTHRALGIAS: 0
LIGHT-HEADEDNESS: 0
NAUSEA: 0
DIARRHEA: 0
PALPITATIONS: 0
DIFFICULTY URINATING: 0
ADENOPATHY: 0
HEADACHES: 0
SHORTNESS OF BREATH: 0
RHINORRHEA: 0
DEPRESSION: 0
APPETITE CHANGE: 0
VOMITING: 0
COUGH: 0
NUMBNESS: 0
LOSS OF SENSATION IN FEET: 0
UNEXPECTED WEIGHT CHANGE: 0
ACTIVITY CHANGE: 0

## 2025-01-29 ASSESSMENT — ACTIVITIES OF DAILY LIVING (ADL)
DOING_HOUSEWORK: INDEPENDENT
GROCERY_SHOPPING: INDEPENDENT
BATHING: INDEPENDENT
DRESSING: INDEPENDENT
TAKING_MEDICATION: INDEPENDENT
MANAGING_FINANCES: INDEPENDENT

## 2025-01-29 ASSESSMENT — PATIENT HEALTH QUESTIONNAIRE - PHQ9
2. FEELING DOWN, DEPRESSED OR HOPELESS: NOT AT ALL
SUM OF ALL RESPONSES TO PHQ9 QUESTIONS 1 AND 2: 0
1. LITTLE INTEREST OR PLEASURE IN DOING THINGS: NOT AT ALL

## 2025-01-29 NOTE — ASSESSMENT & PLAN NOTE
Uses periodic hydrocodone sparingly, controlled medicine agreement is up-to-date, State prescribing database was reviewed today, renew prescription.  Orders:    Follow Up In Primary Care - Established    HYDROcodone-acetaminophen (Norco) 5-325 mg tablet; Take 1 tablet by mouth every 6 hours if needed for severe pain (7 - 10) for up to 13 days.    Follow Up In Primary Care - Established; Future

## 2025-01-29 NOTE — ASSESSMENT & PLAN NOTE
Not currently on maintenance inhaler, use Singulair once a day, albuterol caused jittery and anxiety, overall seems to be doing okay without shortness of breath.  Orders:    Follow Up In Primary Care - Established    montelukast (Singulair) 10 mg tablet; Take 1 tablet (10 mg) by mouth once daily at bedtime.    Follow Up In Primary Care - Established; Future

## 2025-01-29 NOTE — ASSESSMENT & PLAN NOTE
Orders:    Follow Up In Primary Care - Established    HYDROcodone-acetaminophen (Norco) 5-325 mg tablet; Take 1 tablet by mouth every 6 hours if needed for severe pain (7 - 10) for up to 13 days.    Follow Up In Primary Care - Established; Future

## 2025-01-29 NOTE — PROGRESS NOTES
"Subjective   Reason for Visit: Corey Freedman is an 79 y.o. male here for a Medicare Wellness visit.     Past Medical, Surgical, and Family History reviewed and updated in chart.    Reviewed all medications by prescribing practitioner or clinical pharmacist (such as prescriptions, OTCs, herbal therapies and supplements) and documented in the medical record.    HPI  No headache, chest pain, shortness of breath, dizziness, lightheadedness, or edema.  No more palpitations  Does exercise and stretching 6 days a week  To see neurology next week (yearly check)  No falls, no pain in legs, weakness stable  Some LBP, left sided numbness at times, watches steps when walking, uses pain medicine as needed  Emotionally doing OK, some fatigue at times  To have EGD and colonoscopy in April  Albuterol made feel jumping at times    Patient Care Team:  Mahendra Rossi MD as PCP - General (Family Medicine)  Mahen[facility] Ross[facility] PCP [facility]     Review of Systems   Constitutional:  Negative for activity change, appetite change, fatigue and unexpected weight change.   HENT:  Negative for ear pain, nosebleeds, rhinorrhea, sneezing and trouble swallowing.    Respiratory:  Negative for cough, shortness of breath and wheezing.    Cardiovascular:  Negative for chest pain, palpitations and leg swelling.   Gastrointestinal:  Negative for abdominal distention, abdominal pain, constipation, diarrhea, nausea and vomiting.   Genitourinary:  Negative for difficulty urinating.   Musculoskeletal:  Negative for arthralgias.   Skin:  Negative for rash.   Neurological:  Negative for dizziness, light-headedness, numbness and headaches.   Hematological:  Negative for adenopathy.   Psychiatric/Behavioral:  Negative for behavioral problems.    All other systems reviewed and are negative.      Objective   Vitals:  /80   Pulse 65   Ht 1.727 m (5' 8\")   Wt 73.6 kg (162 lb 3.2 oz)   SpO2 95%   BMI 24.66 kg/m²   "     Physical Exam  Vitals and nursing note reviewed.   Constitutional:       General: He is not in acute distress.     Appearance: Normal appearance. He is not toxic-appearing.   HENT:      Head: Normocephalic and atraumatic.      Right Ear: Tympanic membrane, ear canal and external ear normal.      Left Ear: Tympanic membrane, ear canal and external ear normal.      Nose: Nose normal.      Mouth/Throat:      Mouth: Mucous membranes are moist.      Pharynx: Oropharynx is clear.   Eyes:      Extraocular Movements: Extraocular movements intact.      Conjunctiva/sclera: Conjunctivae normal.      Pupils: Pupils are equal, round, and reactive to light.   Cardiovascular:      Rate and Rhythm: Normal rate and regular rhythm.      Pulses: Normal pulses.      Heart sounds: Normal heart sounds.   Pulmonary:      Effort: Pulmonary effort is normal.      Breath sounds: Normal breath sounds.   Abdominal:      General: Abdomen is flat. Bowel sounds are normal.      Palpations: Abdomen is soft.   Musculoskeletal:      Cervical back: Normal range of motion and neck supple.   Skin:     General: Skin is warm and dry.      Capillary Refill: Capillary refill takes less than 2 seconds.   Neurological:      General: No focal deficit present.      Mental Status: He is alert and oriented to person, place, and time. Mental status is at baseline.   Psychiatric:         Mood and Affect: Mood normal.         Behavior: Behavior normal.         Assessment & Plan  Other chronic pain    Orders:    Follow Up In Primary Care - Established    HYDROcodone-acetaminophen (Norco) 5-325 mg tablet; Take 1 tablet by mouth every 6 hours if needed for severe pain (7 - 10) for up to 13 days.    Follow Up In Primary Care - Established; Future    Leg pain, bilateral    Orders:    Follow Up In Primary Care - Established    HYDROcodone-acetaminophen (Norco) 5-325 mg tablet; Take 1 tablet by mouth every 6 hours if needed for severe pain (7 - 10) for up to 13  days.    Follow Up In Primary Care - Established; Future    Lumbosacral radiculopathy  Uses periodic hydrocodone sparingly, controlled medicine agreement is up-to-date, State prescribing database was reviewed today, renew prescription.  Orders:    Follow Up In Primary Care - Established    HYDROcodone-acetaminophen (Norco) 5-325 mg tablet; Take 1 tablet by mouth every 6 hours if needed for severe pain (7 - 10) for up to 13 days.    Follow Up In Primary Care - [facility]; Future    Idiopathic progressive neuropathy  Stable currently without any issues with falls or balance problems.  Orders:    Follow Up In Primary Care - Established    HYDROcodone-acetaminophen (Norco) 5-325 mg tablet; Take 1 tablet by mouth every 6 hours if needed for severe pain (7 - 10) for up to 13 days.    Follow Up In Primary Care [facility]; Future    Post-traumatic subdural hematoma, with unknown loss of consciousness status, sequela    Orders:    Follow Up In Primary Care [facility]    Follow Up In Primary Care - Established; Future    Benign essential hypertension  Blood pressure under good control, renal and electrolytes are normal, no change.  Orders:    Follow Up In Primary Care - [facility]    Follow Up In Primary Care [facility]; Future    Comprehensive Metabolic Panel; Future    Gastroesophageal reflux disease without esophagitis    Orders:    Follow Up In Primary Care - Established    Follow Up In Primary Care - Established; Future    Hypogonadism in male  Continue testosterone replacement, controlled medicine agreement is up-to-date, blood testing so far today look normal, await testosterone level results.  Check PSA testing lipid profile at next office visit.  Orders:    Follow Up In Primary Care [facility]    Follow Up In Primary Care - Established; Future    CBC and Auto Differential; Future    Comprehensive Metabolic Panel; Future    Lipid Panel; Future    Prostate Specific Antigen, Screen; Future     Testosterone,Free and Total; Future    Low serum testosterone level    Orders:    Follow Up In Primary Care - [facility]    Follow Up In Primary Care [facility]; Future    CBC and Auto Differential; Future    Comprehensive Metabolic Panel; Future    Lipid Panel; Future    Prostate Specific Antigen, Screen; Future    Testosterone,Free and Total; Future    Allergy, sequela    Orders:    Follow Up In Primary Care - [facility]    montelukast (Singulair) 10 mg tablet; Take 1 tablet (10 mg) by mouth once daily at bedtime.    Follow Up In Primary Care - [facility]; Future    Anxiety  Stable currently, uses buspirone as needed.  Orders:    Follow Up In Primary Care - [facility]    busPIRone (Buspar) 5 mg tablet; Take 1 tablet (5 mg) by mouth 3 times a day as needed (anxiety).    Follow Up In Primary Care - [facility]; Future    Primary insomnia    Orders:    Follow Up In Primary Care [facility]    Follow Up In Primary Care [facility]; Future    Controlled drug dependence (Multi)    Orders:    Follow Up In Primary Care [facility]    Follow Up In Primary Care [facility]; Future    Mild intermittent reactive airway disease without complication ([facility])  Not currently on maintenance inhaler, use Singulair once a day, albuterol caused jittery and anxiety, overall seems to be doing okay without shortness of breath.  Orders:    Follow Up In Primary Care [facility]    montelukast (Singulair) 10 mg tablet; Take 1 tablet (10 mg) by mouth once daily at bedtime.    Follow Up In Primary Care - [facility]; Future    Routine general medical examination at health care facility    Orders:    Follow Up In Primary Care [facility]; Future    Nausea    Orders:    ondansetron ODT (Zofran-ODT) 4 mg disintegrating tablet; Dissolve 1 tablet (4 mg) in the mouth every 6 hours if needed for nausea.    Follow Up In Primary Care - [facility]; Future    Screening for prostate cancer    Orders:    Follow Up In [facility]  Established; Future    Prostate Specific Antigen, Screen; Future    Mixed hyperlipidemia    Orders:    Follow Up In Primary Care - Established; Future    Comprehensive Metabolic Panel; Future    Lipid Panel; Future

## 2025-01-29 NOTE — ASSESSMENT & PLAN NOTE
Blood pressure under good control, renal and electrolytes are normal, no change.  Orders:    Follow Up In Primary Care - Established    Follow Up In Primary Care - Established; Future    Comprehensive Metabolic Panel; Future

## 2025-01-29 NOTE — ASSESSMENT & PLAN NOTE
Continue testosterone replacement, controlled medicine agreement is up-to-date, blood testing so far today look normal, await testosterone level results.  Check PSA testing lipid profile at next office visit.  Orders:    Follow Up In Primary Care - Established    Follow Up In Primary Care - Established; Future    CBC and Auto Differential; Future    Comprehensive Metabolic Panel; Future    Lipid Panel; Future    Prostate Specific Antigen, Screen; Future    Testosterone,Free and Total; Future

## 2025-01-29 NOTE — ASSESSMENT & PLAN NOTE
Stable currently without any issues with falls or balance problems.  Orders:    Follow Up In Primary Care - Established    HYDROcodone-acetaminophen (Norco) 5-325 mg tablet; Take 1 tablet by mouth every 6 hours if needed for severe pain (7 - 10) for up to 13 days.    Follow Up In Primary Care - Established; Future

## 2025-01-29 NOTE — ASSESSMENT & PLAN NOTE
Stable currently, uses buspirone as needed.  Orders:    Follow Up In Primary Care - Established    busPIRone (Buspar) 5 mg tablet; Take 1 tablet (5 mg) by mouth 3 times a day as needed (anxiety).    Follow Up In Primary Care - Established; Future

## 2025-01-29 NOTE — ASSESSMENT & PLAN NOTE
Orders:    Follow Up In Primary Care - Established    montelukast (Singulair) 10 mg tablet; Take 1 tablet (10 mg) by mouth once daily at bedtime.    Follow Up In Primary Care - Established; Future

## 2025-02-01 LAB
TESTOSTERONE FREE (CHAN): 432.8 PG/ML (ref 30–135)
TESTOSTERONE,TOTAL,LC-MS/MS: 1796 NG/DL (ref 250–1100)

## 2025-02-02 DIAGNOSIS — E29.1 HYPOGONADISM IN MALE: Primary | ICD-10-CM

## 2025-02-06 ENCOUNTER — TELEPHONE (OUTPATIENT)
Age: 80
End: 2025-02-06
Payer: MEDICARE

## 2025-02-15 LAB
TESTOST FREE SERPL-MCNC: 9.1 PG/ML (ref 30–135)
TESTOST SERPL-MCNC: 76 NG/DL (ref 250–1100)

## 2025-04-15 ENCOUNTER — APPOINTMENT (OUTPATIENT)
Dept: GASTROENTEROLOGY | Facility: CLINIC | Age: 80
End: 2025-04-15
Payer: MEDICARE

## 2025-04-15 ENCOUNTER — HOSPITAL ENCOUNTER (OUTPATIENT)
Dept: OPERATING ROOM | Facility: HOSPITAL | Age: 80
Setting detail: OUTPATIENT SURGERY
Discharge: HOME | End: 2025-04-15
Payer: MEDICARE

## 2025-04-15 VITALS
RESPIRATION RATE: 14 BRPM | WEIGHT: 158.51 LBS | TEMPERATURE: 98.6 F | BODY MASS INDEX: 23.48 KG/M2 | DIASTOLIC BLOOD PRESSURE: 72 MMHG | HEIGHT: 69 IN | SYSTOLIC BLOOD PRESSURE: 116 MMHG | OXYGEN SATURATION: 94 % | HEART RATE: 65 BPM

## 2025-04-15 DIAGNOSIS — Z12.11 COLON CANCER SCREENING: ICD-10-CM

## 2025-04-15 DIAGNOSIS — K22.70 BARRETT'S ESOPHAGUS WITHOUT DYSPLASIA: ICD-10-CM

## 2025-04-15 PROCEDURE — 2500000004 HC RX 250 GENERAL PHARMACY W/ HCPCS (ALT 636 FOR OP/ED): Performed by: INTERNAL MEDICINE

## 2025-04-15 PROCEDURE — 45385 COLONOSCOPY W/LESION REMOVAL: CPT | Performed by: INTERNAL MEDICINE

## 2025-04-15 PROCEDURE — 2720000007 HC OR 272 NO HCPCS

## 2025-04-15 PROCEDURE — 7100000010 HC PHASE TWO TIME - EACH INCREMENTAL 1 MINUTE

## 2025-04-15 PROCEDURE — 7100000009 HC PHASE TWO TIME - INITIAL BASE CHARGE

## 2025-04-15 PROCEDURE — 3700000013 HC SEDATION LEVEL 5+ TIME - EACH ADDITIONAL 15 MINUTES

## 2025-04-15 PROCEDURE — 3600000007 HC OR TIME - EACH INCREMENTAL 1 MINUTE - PROCEDURE LEVEL TWO

## 2025-04-15 PROCEDURE — 2500000005 HC RX 250 GENERAL PHARMACY W/O HCPCS: Performed by: INTERNAL MEDICINE

## 2025-04-15 PROCEDURE — 43239 EGD BIOPSY SINGLE/MULTIPLE: CPT | Performed by: INTERNAL MEDICINE

## 2025-04-15 PROCEDURE — 3700000012 HC SEDATION LEVEL 5+ TIME - INITIAL 15 MINUTES 5/> YEARS

## 2025-04-15 PROCEDURE — 3600000002 HC OR TIME - INITIAL BASE CHARGE - PROCEDURE LEVEL TWO

## 2025-04-15 PROCEDURE — G0500 MOD SEDAT ENDO SERVICE >5YRS: HCPCS | Performed by: INTERNAL MEDICINE

## 2025-04-15 RX ORDER — MIDAZOLAM HYDROCHLORIDE 5 MG/ML
INJECTION, SOLUTION INTRAMUSCULAR; INTRAVENOUS AS NEEDED
Status: COMPLETED | OUTPATIENT
Start: 2025-04-15 | End: 2025-04-15

## 2025-04-15 RX ORDER — MEPERIDINE HYDROCHLORIDE 25 MG/ML
INJECTION INTRAMUSCULAR; INTRAVENOUS; SUBCUTANEOUS AS NEEDED
Status: COMPLETED | OUTPATIENT
Start: 2025-04-15 | End: 2025-04-15

## 2025-04-15 RX ORDER — SODIUM CHLORIDE, SODIUM LACTATE, POTASSIUM CHLORIDE, CALCIUM CHLORIDE 600; 310; 30; 20 MG/100ML; MG/100ML; MG/100ML; MG/100ML
50 INJECTION, SOLUTION INTRAVENOUS CONTINUOUS
Status: DISCONTINUED | OUTPATIENT
Start: 2025-04-15 | End: 2025-04-16 | Stop reason: HOSPADM

## 2025-04-15 RX ADMIN — MIDAZOLAM HYDROCHLORIDE 1.5 MG: 5 INJECTION, SOLUTION INTRAMUSCULAR; INTRAVENOUS at 07:58

## 2025-04-15 RX ADMIN — MIDAZOLAM HYDROCHLORIDE 2.5 MG: 5 INJECTION, SOLUTION INTRAMUSCULAR; INTRAVENOUS at 07:45

## 2025-04-15 RX ADMIN — SODIUM CHLORIDE, SODIUM LACTATE, POTASSIUM CHLORIDE, AND CALCIUM CHLORIDE 50 ML/HR: .6; .31; .03; .02 INJECTION, SOLUTION INTRAVENOUS at 09:31

## 2025-04-15 RX ADMIN — MEPERIDINE HYDROCHLORIDE 25 MG: 25 INJECTION INTRAMUSCULAR; INTRAVENOUS; SUBCUTANEOUS at 07:46

## 2025-04-15 RX ADMIN — BENZOCAINE, BUTAMBEN, AND TETRACAINE HYDROCHLORIDE 2 SPRAY: .028; .004; .004 AEROSOL, SPRAY TOPICAL at 07:44

## 2025-04-15 RX ADMIN — MIDAZOLAM HYDROCHLORIDE 1 MG: 5 INJECTION, SOLUTION INTRAMUSCULAR; INTRAVENOUS at 07:50

## 2025-04-15 RX ADMIN — MIDAZOLAM HYDROCHLORIDE 1 MG: 5 INJECTION, SOLUTION INTRAMUSCULAR; INTRAVENOUS at 08:11

## 2025-04-15 RX ADMIN — MEPERIDINE HYDROCHLORIDE 25 MG: 25 INJECTION INTRAMUSCULAR; INTRAVENOUS; SUBCUTANEOUS at 07:48

## 2025-04-15 ASSESSMENT — COLUMBIA-SUICIDE SEVERITY RATING SCALE - C-SSRS
1. IN THE PAST MONTH, HAVE YOU WISHED YOU WERE DEAD OR WISHED YOU COULD GO TO SLEEP AND NOT WAKE UP?: NO
2. HAVE YOU ACTUALLY HAD ANY THOUGHTS OF KILLING YOURSELF?: NO
6. HAVE YOU EVER DONE ANYTHING, STARTED TO DO ANYTHING, OR PREPARED TO DO ANYTHING TO END YOUR LIFE?: NO

## 2025-04-15 ASSESSMENT — PAIN SCALES - GENERAL

## 2025-04-15 ASSESSMENT — PAIN - FUNCTIONAL ASSESSMENT
PAIN_FUNCTIONAL_ASSESSMENT: 0-10

## 2025-04-15 NOTE — H&P
History Of Present Illness  Corey Freedman is a 80 y.o. male presenting with strong family history of colon cancer in his brother.  Has had multiple polyps last colonoscopy tubular adenoma removed from descending colon.  History of lifelong GERD underwent endoscopy for screening 3 years ago found to have short segment Jones's with out metaplasia.  He denies any dysphagia or change in bowel habits..     Past Medical History  Past Medical History:   Diagnosis Date    Altered mental status 02/17/2021    Anxiety     Blurry vision, right eye 11/05/2018    Chemosis, conjunctiva, right 12/12/2018    Encounter for screening for malignant neoplasm of prostate     Screening PSA (prostate specific antigen)    GERD (gastroesophageal reflux disease)     History of subdural hematoma (post traumatic)     Hypertension     Insect bite (nonvenomous) of throat, initial encounter (CODE) 07/11/2022    Tick bite of throat, initial encounter    Meibomitis, left 11/05/2018    Meibomitis, right 11/05/2018    Nausea and/or vomiting 02/04/2023    Neck pain of over 3 months duration 05/19/2015    Neuropathy     Personal history of other endocrine, nutritional and metabolic disease     History of hypokalemia    Personal history of other specified conditions     History of nausea and vomiting    Personal history of other specified conditions     History of fever    Personal history of other specified conditions 03/11/2021    History of syncope    Personal history of other specified conditions 05/21/2021    History of odynophagia    Right rotator cuff tear 02/12/2016    Right shoulder pain 12/15/2015    TIA (transient ischemic attack)      Surgical History  Past Surgical History:   Procedure Laterality Date    MASS EXCISION  02/08/2023    LT LEG    OTHER SURGICAL HISTORY  10/21/2019    Hernia repair    OTHER SURGICAL HISTORY  10/21/2019    Tonsillectomy    OTHER SURGICAL HISTORY  10/21/2019    Esophagogastroduodenoscopy    OTHER SURGICAL  HISTORY  10/21/2019    Endoscopy    OTHER SURGICAL HISTORY  10/21/2019    Rotator cuff repair    OTHER SURGICAL HISTORY  03/10/2022    Colonoscopy    OTHER SURGICAL HISTORY  05/21/2021    Craniectomy    OTHER SURGICAL HISTORY  07/07/2020    Cataract surgery    OTHER SURGICAL HISTORY  12/19/2022    Epidural steroid injection    OTHER SURGICAL HISTORY  11/29/2023    LUMBAR 3-SACRUM 1 LAMINECTOMY, FACETECTOMY, POSTEROLATERAL ARTHRODESIS, POSSIBLE INTERBODY FUSION, PEDICLE SCREW INSTRUMENTATION    SKIN BIOPSY  01/2023     Social History  He reports that he has never smoked. He has never been exposed to tobacco smoke. He has never used smokeless tobacco. He reports current alcohol use. Drug use questions deferred to the physician.    Family History  Family History   Problem Relation Name Age of Onset    Coronary artery disease Mother      Hypertension Mother      Other (Coronary artery anomaly) Mother      Coronary artery disease Father      Other (cardiac arrhythmia) Father      Hypertension Father      Atrial fibrillation Brother      Heart block Brother      Cancer Brother      Other (Cardiac disorder) Brother          Allergies  Allergies   Allergen Reactions    Doxycycline Nausea Only    Pramipexole Nausea Only    Ropinirole Nausea Only    Tetracycline Diarrhea     Review of Systems  Pre-sedation Evaluation:  ASA Classification - ASA 2 - Patient with mild systemic disease with no functional limitations  Mallampati Score - II (hard and soft palate, upper portion of tonsils and uvula visible)    Physical Exam  Vitals and nursing note reviewed.   Constitutional:       Appearance: Normal appearance.   HENT:      Head: Normocephalic.      Mouth/Throat:      Mouth: Mucous membranes are moist.      Pharynx: Oropharynx is clear.   Eyes:      Pupils: Pupils are equal, round, and reactive to light.   Cardiovascular:      Rate and Rhythm: Normal rate and regular rhythm.      Heart sounds: Normal heart sounds.   Pulmonary:       Effort: Pulmonary effort is normal.      Breath sounds: Normal breath sounds.   Abdominal:      General: Abdomen is flat. Bowel sounds are normal.      Palpations: Abdomen is soft.   Musculoskeletal:         General: Normal range of motion.      Cervical back: Normal range of motion and neck supple.   Skin:     General: Skin is warm and dry.   Neurological:      General: No focal deficit present.      Mental Status: He is alert and oriented to person, place, and time.   Psychiatric:         Mood and Affect: Mood normal.         Behavior: Behavior normal.          Last Recorded Vitals  There were no vitals taken for this visit.     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 90 tablet 11    losartan-hydrochlorothiazide (Hyzaar) 100-25 mg tablet Take 1 tablet by mouth once daily. 90 tablet 3    metoprolol succinate XL (Toprol-XL) 50 mg 24 hr tablet Take 1 tablet (50 mg) by mouth 2 times a day. 180 tablet 3    montelukast (Singulair) 10 mg tablet Take 1 tablet (10 mg) by mouth once daily at bedtime. 90 tablet 3    multivitamin capsule Take 1 capsule by mouth once daily.      mupirocin (Bactroban) 2 % cream Apply and gently massage into affected area(s) twice daily      pregabalin (Lyrica) 75 mg capsule Take 1 capsule (75 mg) by mouth 2 times a day. 60 capsule 0    tamsulosin (Flomax) 0.4 mg 24 hr capsule Take 1 capsule (0.4 mg) by mouth once daily. 90 capsule 3    testosterone  20.25 mg/1.25 gram (1.62 %) gel in metered-dose pump Place 2 Pump on the skin once daily. 75 g 2     No current facility-administered medications for this encounter.     Shaquille Lambert DO

## 2025-04-15 NOTE — DISCHARGE INSTRUCTIONS
Patient Instructions after a Colonoscopy      The anesthetics, sedatives or narcotics which were given to you today will be acting in your body for the next 24 hours, so you might feel a little sleepy or groggy.  This feeling should slowly wear off. Carefully read and follow the instructions.     You received sedation today:  - Do not drive or operate any machinery or power tools of any kind.   - No alcoholic beverages today, not even beer or wine.  - Do not make any important decisions or sign any legal documents.  - No over the counter medications that contain alcohol or that may cause drowsiness.  - Do not make any important decisions or sign any legal documents.  - Make sure you have someone with you for first 24 hours.    While it is common to experience mild to moderate abdominal distention, gas, or belching after your procedure, if any of these symptoms occur following discharge from the GI Lab or within one week of having your procedure, call the [facility] to be advised whether a visit to your nearest Urgent Care or Emergency Department is indicated.  Take this paper with you if you go.     - If you develop an allergic reaction to the medications that were given during your procedure such as difficulty breathing, rash, hives, severe nausea, vomiting or lightheadedness.  - If you experience chest pain, shortness of breath, severe abdominal pain, fevers and chills.  -If you develop signs and symptoms of bleeding such as blood in your spit, if your stools turn black, tarry, or bloody  - If you have not urinated within 8 hours following your procedure.  - If your IV site becomes painful, red, inflamed, or looks infected.    If you received a biopsy/polypectomy/sphincterotomy the following instructions apply below:    __ Do not use Aspirin containing products, non-steroidal medications or anti-coagulants for one week following your procedure. (Examples of these types of medications are: Advil,  Arthrotec, Aleve, Coumadin, Ecotrin, Heparin, Ibuprofen, Indocin, Motrin, Naprosyn, Nuprin, Plavix, Vioxx, and Voltarin, or their generic forms.  This list is not all-inclusive.  Check with your physician or pharmacist before resuming medications.)   __ Eat a soft diet today.  Avoid foods that are poorly digested for the next 24 hours.  These foods would include: nuts, beans, lettuce, red meats, and fried foods. Start with liquids and advance your diet as tolerated, gradually work up to eating solids.   __ Do not have a Barium Study or Enema for one week.    Your physician recommends the additional following instructions:    -You have a contact number available for emergencies. The signs and symptoms of potential delayed complications were discussed with you. You may return to normal activities tomorrow.  -Resume your previous diet.  -Continue your present medications.   -We are waiting for your pathology results.  -Your physician has recommended a repeat colonoscopy (date to be determined after pending pathology results are reviewed) for surveillance based on pathology results.  -The findings and recommendations have been discussed with you.  -The findings and recommendations were discussed with your family.  - Please see Medication Reconciliation Form for new medication/medications prescribed.       If you experience any problems or have any questions following discharge from the GI Lab, please call:        Nurse Signature                                                                        Date___________________                                                                            Patient/Responsible Party Signature                                        Date___________________Patient Instructions after an endoscopy or colonoscopy      The anesthetics, sedatives or narcotics which were given to you today will be acting in your body for the next 24 hours, so you might feel a little sleepy or groggy.  This  feeling should slowly wear off. Carefully read and follow the instructions.     You received sedation today:  - Do not drive or operate any machinery or power tools of any kind.   - No alcoholic beverages today, not even beer or wine.  - Do not make any important decisions or sign any legal documents.  - No over the counter medications that contain alcohol or that may cause drowsiness.  - Do not make any important decisions or sign any legal documents.    While it is common to experience mild to moderate abdominal distention, gas, or belching after your procedure, if any of these symptoms occur following discharge from the GI Lab or within one week of having your procedure, call the [facility] to be advised whether a visit to your nearest Urgent Care or Emergency Department is indicated.  Take this paper with you if you go.     - If you develop an allergic reaction to the medications that were given during your procedure such as difficulty breathing, rash, hives, severe nausea, vomiting or lightheadedness.- If you experience chest pain, shortness of breath, severe abdominal pain, fevers and chills.    -If you develop signs and symptoms of bleeding such as blood in your spit, if your stools turn black, tarry, or bloody    - If you have not urinated within 8 hours following your procedure.- If your IV site becomes painful, red, inflamed, or looks infected.

## 2025-04-21 DIAGNOSIS — R79.89 LOW SERUM TESTOSTERONE LEVEL: ICD-10-CM

## 2025-04-21 RX ORDER — TESTOSTERONE 20.25 MG/1.25G
2 GEL TOPICAL DAILY
Qty: 75 G | Refills: 2 | Status: SHIPPED | OUTPATIENT
Start: 2025-04-21 | End: 2025-05-21

## 2025-04-23 ENCOUNTER — TELEPHONE (OUTPATIENT)
Age: 80
End: 2025-04-23
Payer: MEDICARE

## 2025-04-23 DIAGNOSIS — Z12.5 SCREENING FOR PROSTATE CANCER: ICD-10-CM

## 2025-04-25 LAB
LABORATORY COMMENT REPORT: NORMAL
PATH REPORT.FINAL DX SPEC: NORMAL
PATH REPORT.GROSS SPEC: NORMAL
PATH REPORT.RELEVANT HX SPEC: NORMAL
PATH REPORT.TOTAL CANCER: NORMAL

## 2025-04-26 LAB
ALBUMIN SERPL-MCNC: 4.5 G/DL (ref 3.6–5.1)
ALP SERPL-CCNC: 64 U/L (ref 35–144)
ALT SERPL-CCNC: 13 U/L (ref 9–46)
ANION GAP SERPL CALCULATED.4IONS-SCNC: 10 MMOL/L (CALC) (ref 7–17)
AST SERPL-CCNC: 22 U/L (ref 10–35)
BASOPHILS # BLD AUTO: 21 CELLS/UL (ref 0–200)
BASOPHILS NFR BLD AUTO: 0.4 %
BILIRUB SERPL-MCNC: 0.3 MG/DL (ref 0.2–1.2)
BUN SERPL-MCNC: 26 MG/DL (ref 7–25)
CALCIUM SERPL-MCNC: 9.6 MG/DL (ref 8.6–10.3)
CHLORIDE SERPL-SCNC: 102 MMOL/L (ref 98–110)
CHOLEST SERPL-MCNC: 167 MG/DL
CHOLEST/HDLC SERPL: 3 (CALC)
CO2 SERPL-SCNC: 28 MMOL/L (ref 20–32)
CREAT SERPL-MCNC: 1.04 MG/DL (ref 0.7–1.22)
EGFRCR SERPLBLD CKD-EPI 2021: 72 ML/MIN/1.73M2
EOSINOPHIL # BLD AUTO: 111 CELLS/UL (ref 15–500)
EOSINOPHIL NFR BLD AUTO: 2.1 %
ERYTHROCYTE [DISTWIDTH] IN BLOOD BY AUTOMATED COUNT: 16 % (ref 11–15)
GLUCOSE SERPL-MCNC: 91 MG/DL (ref 65–99)
HCT VFR BLD AUTO: 44.4 % (ref 38.5–50)
HDLC SERPL-MCNC: 55 MG/DL
HGB BLD-MCNC: 13.7 G/DL (ref 13.2–17.1)
LDLC SERPL CALC-MCNC: 93 MG/DL (CALC)
LYMPHOCYTES # BLD AUTO: 1590 CELLS/UL (ref 850–3900)
LYMPHOCYTES NFR BLD AUTO: 30 %
MCH RBC QN AUTO: 27.6 PG (ref 27–33)
MCHC RBC AUTO-ENTMCNC: 30.9 G/DL (ref 32–36)
MCV RBC AUTO: 89.5 FL (ref 80–100)
MONOCYTES # BLD AUTO: 557 CELLS/UL (ref 200–950)
MONOCYTES NFR BLD AUTO: 10.5 %
NEUTROPHILS # BLD AUTO: 3021 CELLS/UL (ref 1500–7800)
NEUTROPHILS NFR BLD AUTO: 57 %
NONHDLC SERPL-MCNC: 112 MG/DL (CALC)
PLATELET # BLD AUTO: 197 THOUSAND/UL (ref 140–400)
PMV BLD REES-ECKER: 9.8 FL (ref 7.5–12.5)
POTASSIUM SERPL-SCNC: 4.1 MMOL/L (ref 3.5–5.3)
PROT SERPL-MCNC: 6.9 G/DL (ref 6.1–8.1)
RBC # BLD AUTO: 4.96 MILLION/UL (ref 4.2–5.8)
SODIUM SERPL-SCNC: 140 MMOL/L (ref 135–146)
TESTOST FREE SERPL-MCNC: 6.9 PG/ML (ref 30–135)
TESTOST SERPL-MCNC: 59 NG/DL (ref 250–1100)
TRIGL SERPL-MCNC: 95 MG/DL
WBC # BLD AUTO: 5.3 THOUSAND/UL (ref 3.8–10.8)

## 2025-04-28 ENCOUNTER — TELEPHONE (OUTPATIENT)
Dept: GASTROENTEROLOGY | Facility: CLINIC | Age: 80
End: 2025-04-28

## 2025-04-28 ENCOUNTER — APPOINTMENT (OUTPATIENT)
Age: 80
End: 2025-04-28
Payer: MEDICARE

## 2025-04-28 VITALS
HEART RATE: 59 BPM | SYSTOLIC BLOOD PRESSURE: 134 MMHG | OXYGEN SATURATION: 97 % | HEIGHT: 69 IN | DIASTOLIC BLOOD PRESSURE: 80 MMHG | WEIGHT: 164.6 LBS | BODY MASS INDEX: 24.38 KG/M2

## 2025-04-28 DIAGNOSIS — M54.17 LUMBOSACRAL RADICULOPATHY: ICD-10-CM

## 2025-04-28 DIAGNOSIS — N40.1 BENIGN PROSTATIC HYPERPLASIA WITH NOCTURIA: ICD-10-CM

## 2025-04-28 DIAGNOSIS — R35.1 BENIGN PROSTATIC HYPERPLASIA WITH NOCTURIA: ICD-10-CM

## 2025-04-28 DIAGNOSIS — G89.29 OTHER CHRONIC PAIN: ICD-10-CM

## 2025-04-28 DIAGNOSIS — G60.3 IDIOPATHIC PROGRESSIVE NEUROPATHY: ICD-10-CM

## 2025-04-28 DIAGNOSIS — E29.1 HYPOGONADISM IN MALE: ICD-10-CM

## 2025-04-28 DIAGNOSIS — R11.0 NAUSEA: ICD-10-CM

## 2025-04-28 DIAGNOSIS — F19.20 CONTROLLED DRUG DEPENDENCE (MULTI): ICD-10-CM

## 2025-04-28 DIAGNOSIS — J45.20 MILD INTERMITTENT REACTIVE AIRWAY DISEASE WITHOUT COMPLICATION (HHS-HCC): ICD-10-CM

## 2025-04-28 DIAGNOSIS — I10 BENIGN ESSENTIAL HYPERTENSION: ICD-10-CM

## 2025-04-28 DIAGNOSIS — F41.9 ANXIETY: ICD-10-CM

## 2025-04-28 DIAGNOSIS — T78.40XS ALLERGY, SEQUELA: ICD-10-CM

## 2025-04-28 DIAGNOSIS — E78.2 MIXED HYPERLIPIDEMIA: ICD-10-CM

## 2025-04-28 DIAGNOSIS — K21.9 GASTROESOPHAGEAL REFLUX DISEASE WITHOUT ESOPHAGITIS: ICD-10-CM

## 2025-04-28 DIAGNOSIS — Z12.5 SCREENING FOR PROSTATE CANCER: ICD-10-CM

## 2025-04-28 DIAGNOSIS — F51.01 PRIMARY INSOMNIA: ICD-10-CM

## 2025-04-28 DIAGNOSIS — S06.5XAS: ICD-10-CM

## 2025-04-28 DIAGNOSIS — R79.89 LOW SERUM TESTOSTERONE LEVEL: ICD-10-CM

## 2025-04-28 PROCEDURE — 1159F MED LIST DOCD IN RCRD: CPT | Performed by: FAMILY MEDICINE

## 2025-04-28 PROCEDURE — 1036F TOBACCO NON-USER: CPT | Performed by: FAMILY MEDICINE

## 2025-04-28 PROCEDURE — 1160F RVW MEDS BY RX/DR IN RCRD: CPT | Performed by: FAMILY MEDICINE

## 2025-04-28 PROCEDURE — 99214 OFFICE O/P EST MOD 30 MIN: CPT | Performed by: FAMILY MEDICINE

## 2025-04-28 PROCEDURE — 3075F SYST BP GE 130 - 139MM HG: CPT | Performed by: FAMILY MEDICINE

## 2025-04-28 PROCEDURE — 3079F DIAST BP 80-89 MM HG: CPT | Performed by: FAMILY MEDICINE

## 2025-04-28 PROCEDURE — G2211 COMPLEX E/M VISIT ADD ON: HCPCS | Performed by: FAMILY MEDICINE

## 2025-04-28 PROCEDURE — 1157F ADVNC CARE PLAN IN RCRD: CPT | Performed by: FAMILY MEDICINE

## 2025-04-28 RX ORDER — TAMSULOSIN HYDROCHLORIDE 0.4 MG/1
0.4 CAPSULE ORAL DAILY
Qty: 90 CAPSULE | Refills: 3 | Status: SHIPPED | OUTPATIENT
Start: 2025-04-28 | End: 2026-04-28

## 2025-04-28 RX ORDER — HYDROCODONE BITARTRATE AND ACETAMINOPHEN 5; 325 MG/1; MG/1
1 TABLET ORAL EVERY 6 HOURS PRN
Qty: 50 TABLET | Refills: 0 | Status: SHIPPED | OUTPATIENT
Start: 2025-04-28 | End: 2025-05-11

## 2025-04-28 RX ORDER — ONDANSETRON 4 MG/1
4 TABLET, ORALLY DISINTEGRATING ORAL EVERY 8 HOURS PRN
COMMUNITY
End: 2025-04-28 | Stop reason: SDUPTHER

## 2025-04-28 RX ORDER — ONDANSETRON 4 MG/1
4 TABLET, ORALLY DISINTEGRATING ORAL EVERY 8 HOURS PRN
Qty: 20 TABLET | Refills: 11 | Status: SHIPPED | OUTPATIENT
Start: 2025-04-28 | End: 2025-04-30 | Stop reason: SDUPTHER

## 2025-04-28 RX ORDER — LOSARTAN POTASSIUM AND HYDROCHLOROTHIAZIDE 25; 100 MG/1; MG/1
1 TABLET ORAL DAILY
Qty: 90 TABLET | Refills: 3 | Status: SHIPPED | OUTPATIENT
Start: 2025-04-28 | End: 2026-04-28

## 2025-04-28 ASSESSMENT — ENCOUNTER SYMPTOMS
PALPITATIONS: 0
NERVOUS/ANXIOUS: 0
NAUSEA: 0
VOMITING: 0
SHORTNESS OF BREATH: 0
DIZZINESS: 0
WEAKNESS: 0
ABDOMINAL PAIN: 0
LIGHT-HEADEDNESS: 0
SLEEP DISTURBANCE: 0
HEADACHES: 0
APPETITE CHANGE: 0
BACK PAIN: 1
CHEST TIGHTNESS: 0
FATIGUE: 0
DYSPHORIC MOOD: 0
DIARRHEA: 0
COUGH: 0
ACTIVITY CHANGE: 0
CONSTIPATION: 0

## 2025-04-28 NOTE — ASSESSMENT & PLAN NOTE
Renewed pain medication today for short prescription, state prescribing database was reviewed, controlled medicine agreement urine drug testing both up-to-date

## 2025-04-28 NOTE — PROGRESS NOTES
"Subjective   Patient ID: Corey Freedman is a 80 y.o. male who presents for 3 MO LABS.    HPI   No headache, chest pain, shortness of breath, dizziness, lightheadedness, or edema  Seen neurology in February, re-check in a year  Weakness in legs, had surgery over a year ago, tries to be active  N/T in feet stable  No issues with thought process, still has issues with words with speech  No falls, back to using 2 pumps of testosterone a day, was low at last lab  Some urine flow issues, slow flow, no nocturia  Chronic LBP and neck pain    Review of Systems   Constitutional:  Negative for activity change, appetite change and fatigue.   Respiratory:  Negative for cough, chest tightness and shortness of breath.    Cardiovascular:  Negative for chest pain, palpitations and leg swelling.   Gastrointestinal:  Negative for abdominal pain, constipation, diarrhea, nausea and vomiting.   Musculoskeletal:  Positive for back pain. Negative for gait problem.   Neurological:  Negative for dizziness, weakness, light-headedness and headaches.   Psychiatric/Behavioral:  Negative for dysphoric mood and sleep disturbance. The patient is not nervous/anxious.        Objective   /80   Pulse 59   Ht 1.753 m (5' 9\")   Wt 74.7 kg (164 lb 9.6 oz)   SpO2 97%   BMI 24.31 kg/m²     Physical Exam  Vitals and nursing note reviewed.   Constitutional:       Appearance: Normal appearance.   HENT:      Head: Normocephalic and atraumatic.      Right Ear: Tympanic membrane, ear canal and external ear normal.      Left Ear: Tympanic membrane, ear canal and external ear normal.      Nose: Nose normal.      Mouth/Throat:      Mouth: Mucous membranes are moist.      Pharynx: Oropharynx is clear.   Cardiovascular:      Rate and Rhythm: Normal rate and regular rhythm.      Pulses: Normal pulses.      Heart sounds: Normal heart sounds.   Pulmonary:      Effort: Pulmonary effort is normal.      Breath sounds: Normal breath sounds.   Musculoskeletal: "      Cervical back: Normal range of motion and neck supple.   Skin:     General: Skin is warm and dry.      Capillary Refill: Capillary refill takes less than 2 seconds.   Neurological:      Mental Status: He is alert.   Psychiatric:         Mood and Affect: Mood normal.         Behavior: Behavior normal.         OARRS:  Mahendra Rossi MD on 4/28/2025 11:18 AM  I have personally reviewed the OARRS report for Corey Freedman. I have considered the risks of abuse, dependence, addiction and diversion    Is the patient prescribed a combination of a benzodiazepine and opioid?  No    Last Urine Drug Screen / ordered today: No  Recent Results (from the past 8760 hours)   DRUG SCREEN,URINE    Collection Time: 07/26/24 11:51 AM   Result Value Ref Range    Amphetamine Screen, Urine Presumptive Negative Presumptive Negative    Barbiturate Screen, Urine Presumptive Negative Presumptive Negative    Benzodiazepines Screen, Urine Presumptive Negative Presumptive Negative    Cannabinoid Screen, Urine Presumptive Negative Presumptive Negative    Cocaine Metabolite Screen, Urine Presumptive Negative Presumptive Negative    Fentanyl Screen, Urine Presumptive Negative Presumptive Negative    Opiate Screen, Urine Presumptive Negative Presumptive Negative    Oxycodone Screen, Urine Presumptive Negative Presumptive Negative    PCP Screen, Urine Presumptive Negative Presumptive Negative    Methadone Screen, Urine Presumptive Negative Presumptive Negative     Results are as expected.         Controlled Substance Agreement:  Date of the Last Agreement: 7/26/24  Reviewed Controlled Substance Agreement including but not limited to the benefits, risks, and alternatives to treatment with a Controlled Substance medication(s).    Opioids:  What is the patient's goal of therapy?  To help with pain  Is this being achieved with current treatment?  Yes    I have calculated the patient's Morphine Dose Equivalent (MED):   I have considered  referral to Pain Management and/or a specialist, and do not feel it is necessary at this time.    I feel that it is clinically indicated to continue this current medication regimen after consideration of alternative therapies, and other non-opioid treatment.    Opioid Risk Screening:  No data recorded    Pain Assessment:  Analgesia  What was your pain level on average during the past week?: 5  What was your pain level at its worst during the past week?: 8  What percentage of your pain has been relieved during the past week?: 60 %  Is the amount of pain relief you are now obtaining from your current pain relievers enough to make a real difference in your life?: Y  Query to Clinician: Is the patient's pain relief clinically significant?: Yes    Activities of Daily Living  Physical Functioning: Same  Family Relationships: Same  Social Relationships: Same  Mood: Same  Sleep Patterns: Same  Overall Functioning: Same    Adverse Events  Is patient experiencing any side effects from current pain relievers?: N  Patient's Overall Severity of Side Effects: None      Assessment  Is your overall impression that this patient is benefiting from opioid therapy?: Yes  Specific Analgesic Plan: Continue present regimen        Assessment/Plan   Problem List Items Addressed This Visit           ICD-10-CM    Allergies T78.40XA    Relevant Orders    Follow Up In Primary Care - Established    Anxiety F41.9    Relevant Orders    Follow Up In Primary Care - Established    Benign essential hypertension I10    Blood pressure stable, renal electrolytes stable, no change.         Relevant Medications    losartan-hydrochlorothiazide (Hyzaar) 100-25 mg tablet    Other Relevant Orders    Follow Up In Primary Care - Established    Benign prostatic hyperplasia with nocturia N40.1, R35.1    Relevant Medications    tamsulosin (Flomax) 0.4 mg 24 hr capsule    Other Relevant Orders    Follow Up In Primary Care - Established    Lumbosacral radiculopathy  M54.17    Renewed pain medication today for short prescription, state prescribing database was reviewed, controlled medicine agreement urine drug testing both up-to-date         Relevant Medications    HYDROcodone-acetaminophen (Norco) 5-325 mg tablet    Other Relevant Orders    Follow Up In Primary Care - Established    Chronic pain G89.29    Relevant Orders    Follow Up In Primary Care - Established    Hypogonadism in male E29.1    Continue with testosterone replacement, patient recently increased dosing, recheck again in 3 months         Relevant Orders    Follow Up In Primary Care - Established    Insomnia G47.00    Relevant Orders    Follow Up In Primary Care - Established    Peripheral neuropathy G62.9    Seen neurology in the past 6 months, no change.         Relevant Medications    HYDROcodone-acetaminophen (Norco) 5-325 mg tablet    Other Relevant Orders    Follow Up In Primary Care - Established    RAD (reactive airway disease) ([facility]) J45.909    Stable.         Relevant Orders    Follow Up In Primary Care - Established     Other Visit Diagnoses         Codes      Post-traumatic subdural hematoma, with unknown loss of consciousness status, sequela     S06.5XAS    Relevant Orders    Follow Up In Primary Care - Established      Gastroesophageal reflux disease without esophagitis     K21.9    Relevant Orders    Follow Up In Primary Care - Established      Low serum testosterone level     R79.89    Relevant Orders    Follow Up In Primary Care - Established    Testosterone,Free and Total      Controlled drug dependence (Multi)     F19.20    Relevant Orders    Follow Up In Primary Care - Established      Nausea     R11.0    Relevant Medications    ondansetron ODT (Zofran-ODT) 4 mg disintegrating tablet    Other Relevant Orders    Follow Up In Primary Care - Established      Screening for prostate cancer     Z12.5    Relevant Orders    Follow Up In Primary Care - Established      Mixed hyperlipidemia     E78.2     Relevant Orders    Follow Up In Primary Care - Established

## 2025-04-28 NOTE — ASSESSMENT & PLAN NOTE
Continue with testosterone replacement, patient recently increased dosing, recheck again in 3 months

## 2025-04-28 NOTE — TELEPHONE ENCOUNTER
PATIENT NOTIFIED AND VERBALIZED UNDERSTANDING     3 year repeat card made and filed    ----- Message from Shaquille Lambert sent at 4/28/2025  9:55 AM EDT -----  Upper endoscopy shows reflux changes without Jones's esophagus at this time.  Continue long-term PPI therapy and antireflux diet.    2 small polyps 1 adenomatous and 1 villous.  Given age may hold off on repeat however if acceptable would repeat in 3 years  ----- Message -----  From: Lab, Background User  Sent: 4/25/2025   9:56 AM EDT  To: Shaquille Lambert DO

## 2025-04-29 ENCOUNTER — TELEPHONE (OUTPATIENT)
Age: 80
End: 2025-04-29

## 2025-04-29 ENCOUNTER — APPOINTMENT (OUTPATIENT)
Age: 80
End: 2025-04-29
Payer: MEDICARE

## 2025-04-29 DIAGNOSIS — R11.0 NAUSEA: ICD-10-CM

## 2025-04-29 NOTE — TELEPHONE ENCOUNTER
PATIENT CALLED TO INQUIRE WHY HE RECEIVED ONLY #20 OF ONDANSETRON. HE USUALLY GETS #90. HE TAKES ONE EVERY MORNING. PLEASE ADVISE.

## 2025-04-30 RX ORDER — ONDANSETRON 4 MG/1
4 TABLET, ORALLY DISINTEGRATING ORAL EVERY 8 HOURS PRN
Qty: 90 TABLET | Refills: 2 | Status: SHIPPED | OUTPATIENT
Start: 2025-04-30 | End: 2026-04-30

## 2025-04-30 NOTE — TELEPHONE ENCOUNTER
I was not aware that he was using this on a daily basis, normally patients use this only as needed.  I can send a prescription into drug [facility] for 90 tablets.

## 2025-05-01 ENCOUNTER — TELEPHONE (OUTPATIENT)
Age: 80
End: 2025-05-01
Payer: MEDICARE

## 2025-05-01 NOTE — TELEPHONE ENCOUNTER
PATIENT NOTIFIED. VERBALIZED UNDERSTANDING.       I was not aware that he was using this on a daily basis, normally patients use this only as needed.  I can send a prescription into drug [facility] for 90 tablets.          4/30/25  3:31 PM   Mahendra Rossi MD signed an order    4/29/25  4:25 PM  You routed this conversation to Mahendra Rossi MD  Me      4/29/25  4:25 PM  Note     PATIENT CALLED TO INQUIRE WHY HE RECEIVED ONLY #20 OF ONDANSETRON. HE USUALLY GETS #90. HE TAKES ONE EVERY MORNING. PLEASE ADVISE.

## 2025-07-03 ENCOUNTER — TELEPHONE (OUTPATIENT)
Age: 80
End: 2025-07-03
Payer: MEDICARE
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
## 2025-07-03 RX ORDER — DOXYCYCLINE 100 MG/1
CAPSULE ORAL
Qty: 2 CAPSULE | Refills: 0 | Status: SHIPPED | OUTPATIENT
Start: 2025-07-03

## 2025-07-03 NOTE — TELEPHONE ENCOUNTER
Doxycycline is the preferred medication for this, he did have Lyme disease a little over a year ago, usually for a tick bite if the tick is been on for a short period of time and it was less than 72 hours, 2 doxycycline tablets once is usually enough to try to prevent issues.  He has listed an allergy to doxycycline which sounds like more side effects, make sure he takes the medication with food and drinks plenty of water.

## 2025-07-03 NOTE — TELEPHONE ENCOUNTER
PATIENT LEFT MESSAGE STATING HE HAD A TICK BITE AND IS UNSURE HOW LONG IT WAS ON HIM, STATES HE WAS FEELING ACHY AFTERWARDS, READ THAT TETRACYCLINE IS USED TO HELP IF THERE IS POSSIBILITY OF LYME DISEASE. IF YOU FEEL THIS IS APPROPRIATE HE WOULD LIKE MEDS TO BE SENT TO [facility].   DOES HE NEED LABS FOR THIS?     PLEASE ADVISE.

## 2025-07-03 NOTE — TELEPHONE ENCOUNTER
Left detailed message that medication was sent to the pharmacy and for patient to take it with food and drink plenty of fluid.

## 2025-07-04 ENCOUNTER — TELEPHONE (OUTPATIENT)
Age: 80
End: 2025-07-04
Payer: MEDICARE
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
## 2025-07-04 RX ORDER — DOXYCYCLINE 100 MG/1
100 CAPSULE ORAL 2 TIMES DAILY
Qty: 14 CAPSULE | Refills: 0 | Status: SHIPPED | OUTPATIENT
Start: 2025-07-04 | End: 2025-07-11

## 2025-07-07 NOTE — TELEPHONE ENCOUNTER
Patient called after hours complaining of fevers and chills and bodyaches.  Had had a tick bite that was treated with 200 mg once with doxycycline, will place on a 7-day course of doxycycline, did have Lyme disease a little over a year ago.

## 2025-07-21 DIAGNOSIS — Z12.5 SCREENING FOR PROSTATE CANCER: ICD-10-CM

## 2025-07-21 ASSESSMENT — ENCOUNTER SYMPTOMS
NECK PAIN: 1
BACK PAIN: 1
NEUROLOGIC COMPLAINT: 1

## 2025-07-23 LAB — PSA SERPL-MCNC: 1.41 NG/ML

## 2025-07-27 LAB
TESTOST FREE SERPL-MCNC: 54.6 PG/ML (ref 30–135)
TESTOST SERPL-MCNC: 475 NG/DL (ref 250–1100)

## 2025-07-28 ENCOUNTER — APPOINTMENT (OUTPATIENT)
Age: 80
End: 2025-07-28
Payer: MEDICARE

## 2025-07-28 VITALS
HEART RATE: 86 BPM | WEIGHT: 159 LBS | SYSTOLIC BLOOD PRESSURE: 122 MMHG | HEIGHT: 69 IN | DIASTOLIC BLOOD PRESSURE: 74 MMHG | BODY MASS INDEX: 23.55 KG/M2

## 2025-07-28 DIAGNOSIS — F19.20 CONTROLLED DRUG DEPENDENCE (MULTI): ICD-10-CM

## 2025-07-28 DIAGNOSIS — E78.2 MIXED HYPERLIPIDEMIA: ICD-10-CM

## 2025-07-28 DIAGNOSIS — R79.89 LOW SERUM TESTOSTERONE LEVEL: ICD-10-CM

## 2025-07-28 DIAGNOSIS — F51.01 PRIMARY INSOMNIA: ICD-10-CM

## 2025-07-28 DIAGNOSIS — J45.20 MILD INTERMITTENT REACTIVE AIRWAY DISEASE WITHOUT COMPLICATION (HHS-HCC): ICD-10-CM

## 2025-07-28 DIAGNOSIS — G89.29 OTHER CHRONIC PAIN: ICD-10-CM

## 2025-07-28 DIAGNOSIS — M54.17 LUMBOSACRAL RADICULOPATHY: ICD-10-CM

## 2025-07-28 DIAGNOSIS — Z12.5 SCREENING FOR PROSTATE CANCER: ICD-10-CM

## 2025-07-28 DIAGNOSIS — R35.1 BENIGN PROSTATIC HYPERPLASIA WITH NOCTURIA: ICD-10-CM

## 2025-07-28 DIAGNOSIS — N40.1 BENIGN PROSTATIC HYPERPLASIA WITH NOCTURIA: ICD-10-CM

## 2025-07-28 DIAGNOSIS — F41.9 ANXIETY: ICD-10-CM

## 2025-07-28 DIAGNOSIS — I10 BENIGN ESSENTIAL HYPERTENSION: ICD-10-CM

## 2025-07-28 DIAGNOSIS — T78.40XS ALLERGY, SEQUELA: ICD-10-CM

## 2025-07-28 DIAGNOSIS — G60.3 IDIOPATHIC PROGRESSIVE NEUROPATHY: ICD-10-CM

## 2025-07-28 DIAGNOSIS — E29.1 HYPOGONADISM IN MALE: Primary | ICD-10-CM

## 2025-07-28 DIAGNOSIS — S06.5XAS: ICD-10-CM

## 2025-07-28 PROCEDURE — 1159F MED LIST DOCD IN RCRD: CPT | Performed by: FAMILY MEDICINE

## 2025-07-28 PROCEDURE — 3074F SYST BP LT 130 MM HG: CPT | Performed by: FAMILY MEDICINE

## 2025-07-28 PROCEDURE — 1036F TOBACCO NON-USER: CPT | Performed by: FAMILY MEDICINE

## 2025-07-28 PROCEDURE — 99214 OFFICE O/P EST MOD 30 MIN: CPT | Performed by: FAMILY MEDICINE

## 2025-07-28 PROCEDURE — 3078F DIAST BP <80 MM HG: CPT | Performed by: FAMILY MEDICINE

## 2025-07-28 PROCEDURE — G2211 COMPLEX E/M VISIT ADD ON: HCPCS | Performed by: FAMILY MEDICINE

## 2025-07-28 PROCEDURE — 1160F RVW MEDS BY RX/DR IN RCRD: CPT | Performed by: FAMILY MEDICINE

## 2025-07-28 RX ORDER — HYDROCODONE BITARTRATE AND ACETAMINOPHEN 5; 325 MG/1; MG/1
1 TABLET ORAL EVERY 6 HOURS PRN
Qty: 50 TABLET | Refills: 0 | Status: SHIPPED | OUTPATIENT
Start: 2025-07-28 | End: 2025-08-11

## 2025-07-28 RX ORDER — BUSPIRONE HYDROCHLORIDE 5 MG/1
5 TABLET ORAL 3 TIMES DAILY PRN
Qty: 90 TABLET | Refills: 11 | Status: SHIPPED | OUTPATIENT
Start: 2025-07-28 | End: 2026-07-28

## 2025-07-28 RX ORDER — TESTOSTERONE 20.25 MG/1.25G
2 GEL TOPICAL DAILY
Qty: 75 G | Refills: 2 | Status: SHIPPED | OUTPATIENT
Start: 2025-07-28 | End: 2025-08-28

## 2025-07-28 ASSESSMENT — ENCOUNTER SYMPTOMS
RHINORRHEA: 0
CONSTIPATION: 0
SHORTNESS OF BREATH: 0
TROUBLE SWALLOWING: 0
ACTIVITY CHANGE: 0
DIZZINESS: 0
LIGHT-HEADEDNESS: 0
DIARRHEA: 0
ABDOMINAL DISTENTION: 0
COUGH: 0
NEUROLOGIC COMPLAINT: 1
NECK PAIN: 1
VOMITING: 0
HEADACHES: 0
APPETITE CHANGE: 0
FATIGUE: 0
BACK PAIN: 1
ABDOMINAL PAIN: 0
ARTHRALGIAS: 0
WHEEZING: 0
DYSPHORIC MOOD: 0
UNEXPECTED WEIGHT CHANGE: 0
PALPITATIONS: 0
SLEEP DISTURBANCE: 0
NUMBNESS: 0
DIFFICULTY URINATING: 0
NAUSEA: 0
NERVOUS/ANXIOUS: 0
ADENOPATHY: 0

## 2025-07-28 ASSESSMENT — LIFESTYLE VARIABLES: TOTAL SCORE: 0

## 2025-07-28 NOTE — PROGRESS NOTES
"Subjective   Patient ID: Corey Freedman is a 80 y.o. male who presents for 3 month follow up.    Acute Neurological Problem  This is a chronic problem. The current episode started more than 1 year ago. The neurological problem developed gradually. The problem has been waxing and waning since onset. There was lower extremity focality noted. Associated symptoms include back pain and neck pain. Pertinent negatives include no abdominal pain, chest pain, dizziness, fatigue, headaches, light-headedness, nausea, palpitations, shortness of breath or vomiting. Past treatments include medication. The treatment provided mild relief.      No headache, chest pain, shortness of breath, dizziness, lightheadedness, or edema  Post EGD and colonoscopy this past Spring  Took Doxy for 7 days earlier this month for a tick bite, had diagnosed Lyme a year ago, had some \"neurological symptoms\" and joint pain and myalgias, felt better after 4 days, temperatures improved  Feeling better now from tick bite  No issues with speech, or fluency  No headaches, joint stiffness back to \"normal\", some N/T in feet, not in hands, no falls (careful with balance)  Chronic LBP/neck pain, uses pain medicine 1/2 a time usually 1 a day on average    No urine issues, nocturia 1-2 a night on averageOARRS:  Mahendra Rossi MD on 7/28/2025  1:39 PM  I have personally reviewed the OARRS report for Corey Freedman. I have considered the risks of abuse, dependence, addiction and diversion    Is the patient prescribed a combination of a benzodiazepine and opioid?  No    Last Urine Drug Screen / ordered today: No  No results found for this or any previous visit (from the past 8760 hours).  Results are as expected.     Clinical rationale for not completing a Urine Drug Screen: Will update controlled medicine agreement and urine drug testing at next office visit in 3 months.      Controlled Substance Agreement:  Date of the Last Agreement: " 7/26/24  Reviewed Controlled Substance Agreement including but not limited to the benefits, risks, and alternatives to treatment with a Controlled Substance medication(s).    Opioids:  What is the patient's goal of therapy?  To help with chronic back pain  Is this being achieved with current treatment?  Yes    I have calculated the patient's Morphine Dose Equivalent (MED):   I have considered referral to Pain Management and/or a specialist, and do not feel it is necessary at this time.    I feel that it is clinically indicated to continue this current medication regimen after consideration of alternative therapies, and other non-opioid treatment.    Opioid Risk Screening:  Family History of Substance Abuse  Alcohol: 0 (7/28/2025  5:00 PM)  Illegal Drugs: 0 (7/28/2025  5:00 PM)  Prescription Drugs: 0 (7/28/2025  5:00 PM)    Personal History of Substance Abuse  Alcohol: 0 (7/28/2025  5:00 PM)  Illegal Drugs: 0 (7/28/2025  5:00 PM)  Prescription Drugs: 0 (7/28/2025  5:00 PM)    Patient Age (16-45)  Age (16-45): 0 (7/28/2025  5:00 PM)    History of Preadolescent Sexual Abuse  History of Preadolescent Sexual Abuse: 0 (7/28/2025  5:00 PM)    Psychological Disease  Attention Deficit Disorder, Obsessive Compulsive Disorder, Bipolar, Schizophrenia: 0 (7/28/2025  5:00 PM)  Depression: 0 (7/28/2025  5:00 PM)    Total Score  Total Score: 0 (7/28/2025  5:00 PM)    Total Score Risk Category  TOTAL SCORE CATEGORY: Low Risk (0-3) (7/28/2025  5:00 PM)        Pain Assessment:  Analgesia  What was your pain level on average during the past week?: 3  What was your pain level at its worst during the past week?: 6  What percentage of your pain has been relieved during the past week?: 70 %  Is the amount of pain relief you are now obtaining from your current pain relievers enough to make a real difference in your life?: Y  Query to Clinician: Is the patient's pain relief clinically significant?: Yes    Activities of Daily Living  Physical  Functioning: Better  Family Relationships: Same  Social Relationships: Same  Mood: Same  Sleep Patterns: Better  Overall Functioning: Better    Adverse Events  Is patient experiencing any side effects from current pain relievers?: N  Patient's Overall Severity of Side Effects: None      Assessment  Is your overall impression that this patient is benefiting from opioid therapy?: Yes  Specific Analgesic Plan: Continue present regimen     and Testosterone:  What is the patient's goal of therapy?  To replace testosterone  Is this being achieved with current treatment?  Yes    I attest the patient does not have: Breast Cancer, Polycythemia, or Prostate Cancer    Last Testosterone check:  TESTOSTERONE, FREE   Date Value Ref Range Status   07/22/2025 54.6 30.0 - 135.0 pg/mL Final     Comment:        This test was developed and its analytical performance  characteristics have been determined by [place], [place]. It has  not been cleared or approved by the U.S. Food and Drug  Administration. This assay has been validated pursuant  to the CLIA regulations and is used for clinical  purposes.          TESTOSTERONE, TOTAL, MS   Date Value Ref Range Status   07/22/2025 475 250 - 1,100 ng/dL Final     Comment:        Men with clinically significant hypogonadal  symptoms and testosterone values repeatedly in  the range of the 200-300 ng/dL or less, may  benefit from testosterone treatment after  adequate risk and benefits counseling.            For additional information, please refer to  http://education.[employer]/faq/  [id number]  (This link is being provided for informational/  educational purposes only.)     This test was developed and its analytical performance  characteristics have been determined by [place] [address] [place], [place]. It has  not been cleared or approved by the U.S. Food and Drug  Administration. This assay has been validated  "pursuant  to the CLIA regulations and is used for clinical  purposes.          Testosterone, Total, LC-MS/MS   Date Value Ref Range Status   01/24/2025 1796 (H) 250 - 1100 ng/dL Final     Comment:        Men with clinically significant hypogonadal  symptoms and testosterone values repeatedly in  the range of the 200-300 ng/dL or less, may  benefit from testosterone treatment after  adequate risk and benefits counseling.            For additional information, please refer to  http://education.[employer]/faq/  [id number]  (This link is being provided for informational/  educational purposes only.)     This test was developed and its analytical performance  characteristics have been determined by [facility] [place], [place]. It has  not been cleared or approved by the U.S. Food and Drug  Administration. This assay has been validated pursuant  to the CLIA regulations and is used for clinical  purposes.          Last CBC:    No results found for: \"CBCDIF\", \"BMCBC\", \"PR1\"    Last PSA:   PSA, TOTAL   Date Value Ref Range Status   07/22/2025 1.41 < OR = 4.00 ng/mL Final     Comment:     The total PSA value from this assay system is   standardized against the WHO standard. The test   result will be approximately 20% lower when compared   to the equimolar-standardized total PSA ([place]   [place]). Comparison of serial PSA results should be   interpreted with this fact in mind.     This test was performed using the Siemens   chemiluminescent method. Values obtained from   different assay methods cannot be used  interchangeably. PSA levels, regardless of  value, should not be interpreted as absolute  evidence of the presence or absence of disease.       Prostate Specific Antigen,Screen   Date Value Ref Range Status   07/18/2024 1.06 <=4.00 ng/mL Final       Activities of Daily Living:   Is your overall impression that this patient is benefiting (symptom reduction outweighs " "side effects) from testosterone therapy? Yes     1. Physical Functioning: Same  2. Family Relationship: Same  3. Social Relationship: Same  4. Mood: Same  5. Sleep Patterns: Same  6. Overall Function: Same      Review of Systems   Constitutional:  Negative for activity change, appetite change, fatigue and unexpected weight change.   HENT:  Negative for ear pain, nosebleeds, rhinorrhea, sneezing and trouble swallowing.    Respiratory:  Negative for cough, shortness of breath and wheezing.    Cardiovascular:  Negative for chest pain, palpitations and leg swelling.   Gastrointestinal:  Negative for abdominal distention, abdominal pain, constipation, diarrhea, nausea and vomiting.   Genitourinary:  Negative for difficulty urinating.   Musculoskeletal:  Positive for back pain and neck pain. Negative for arthralgias.   Skin:  Negative for rash.   Neurological:  Negative for dizziness, light-headedness, numbness and headaches.   Hematological:  Negative for adenopathy.   Psychiatric/Behavioral:  Negative for behavioral problems, dysphoric mood and sleep disturbance. The patient is not nervous/anxious.    All other systems reviewed and are negative.      Objective   /74   Pulse 86   Ht 1.753 m (5' 9\")   Wt 72.1 kg (159 lb)   BMI 23.48 kg/m²     Physical Exam  Vitals and nursing note reviewed.   Constitutional:       General: He is not in acute distress.     Appearance: Normal appearance. He is not toxic-appearing.   HENT:      Head: Normocephalic and atraumatic.      Right Ear: Tympanic membrane, ear canal and external ear normal.      Left Ear: Tympanic membrane, ear canal and external ear normal.      Nose: Nose normal.      Mouth/Throat:      Mouth: Mucous membranes are moist.      Pharynx: Oropharynx is clear.     Eyes:      Extraocular Movements: Extraocular movements intact.      Conjunctiva/sclera: Conjunctivae normal.      Pupils: Pupils are equal, round, and reactive to light.       Cardiovascular:      " Rate and Rhythm: Normal rate and regular rhythm.      Pulses: Normal pulses.      Heart sounds: Normal heart sounds.   Pulmonary:      Effort: Pulmonary effort is normal.      Breath sounds: Normal breath sounds.   Abdominal:      General: Abdomen is flat. Bowel sounds are normal.      Palpations: Abdomen is soft.     Musculoskeletal:      Cervical back: Normal range of motion and neck supple.     Skin:     General: Skin is warm and dry.      Capillary Refill: Capillary refill takes less than 2 seconds.     Neurological:      General: No focal deficit present.      Mental Status: He is alert and oriented to person, place, and time. Mental status is at baseline.     Psychiatric:         Mood and Affect: Mood normal.         Behavior: Behavior normal.         Assessment/Plan   Problem List Items Addressed This Visit           ICD-10-CM    Allergies T78.40XA    Relevant Orders    Follow Up In Primary Care - Established    Follow Up In Primary Care - Established    Anxiety F41.9    Seems to be stable currently, rarely has to use buspirone         Relevant Medications    busPIRone (Buspar) 5 mg tablet    Other Relevant Orders    Follow Up In Primary Care - Established    Follow Up In Primary Care - Established    Benign essential hypertension I10    Blood pressures under good control, renal electrolytes stable, no change         Relevant Orders    Follow Up In Primary Care - Established    Comprehensive Metabolic Panel    Follow Up In Primary Care - Established    Benign prostatic hyperplasia with nocturia N40.1, R35.1    Relevant Orders    Follow Up In Primary Care - Established    Follow Up In Primary Care - Established    Lumbosacral radiculopathy M54.17    Has chronic intermittent back pain uses half to 1 Norco tablet a day, controlled medicine agreement needs to be updated at next office visit, controlled medicine database was reviewed per [place]AR[place], patient seems to be compliant with medication.         Relevant  Medications    HYDROcodone-acetaminophen (Norco) 5-325 mg tablet    Other Relevant Orders    Follow Up In Primary Care - Established    Follow Up In Primary Care - Established    Chronic pain G89.29    Relevant Medications    HYDROcodone-acetaminophen (Norco) 5-325 mg tablet    Other Relevant Orders    Follow Up In Primary Care - Established    Follow Up In Primary Care - Established    Hypogonadism in male - Primary E29.1    Continue with testosterone replacement, state prescribing database was reviewed today, will need to update controlled medicine agreement at next office visit.  Laboratory testing reveals stable testosterone levels.         Relevant Orders    Follow Up In Primary Care - Established    CBC and Auto Differential    Comprehensive Metabolic Panel    Testosterone,Free and Total    Follow Up In Primary Care - Established    Insomnia G47.00    Relevant Orders    Follow Up In Primary Care - Established    Follow Up In Primary Care - Established    Peripheral neuropathy G62.9    Stable currently.         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         Relevant Orders    Follow Up In Primary Care - Established    Follow Up In Primary Care - Established    RAD (reactive airway disease) ([facility]) J45.909    Relevant Orders    Follow Up In Primary Care - Established    Follow Up In Primary Care - Established    Controlled drug dependence ([facility]) F19.20    Relevant Orders    Follow Up In Primary Care - Established    Follow Up In Primary Care - Established     Other Visit Diagnoses         Codes      Low serum testosterone level     R79.89    Relevant Medications    testosterone 20.25 mg/1.25 gram (1.62 %) gel  in metered-dose pump    Other Relevant Orders    Follow Up In Primary Care - Established    CBC and Auto Differential    Comprehensive Metabolic Panel    Testosterone,Free and Total    Follow Up In Primary Care - Established      Screening for prostate cancer     Z12.5    Relevant Orders    Follow Up In Primary Care - Established    Follow Up In Primary Care - Established      Mixed hyperlipidemia     E78.2    Relevant Orders    Follow Up In Primary Care - Established    Follow Up In Primary Care - Established        Patient was treated in the past month with doxycycline for 10 days due to what was presumed to be a possible tick bite with fevers, chills and myalgias.  Patient was treated approximately 3 years ago for acute Lyme disease, will continue to monitor.

## 2025-07-28 NOTE — ASSESSMENT & PLAN NOTE
Has chronic intermittent back pain uses half to 1 Norco tablet a day, controlled medicine agreement needs to be updated at next office visit, controlled medicine database was reviewed per OARRS, patient seems to be compliant with medication.

## 2025-07-28 NOTE — ASSESSMENT & PLAN NOTE
Continue with testosterone replacement, state prescribing database was reviewed today, will need to update controlled medicine agreement at next office visit.  Laboratory testing reveals stable testosterone levels.

## 2025-07-28 NOTE — ASSESSMENT & PLAN NOTE
Neurologically seems to be intact, functionally doing well, no trouble with confusion or day-to-day activities.

## 2025-08-11 DIAGNOSIS — G89.29 OTHER CHRONIC PAIN: ICD-10-CM

## 2025-08-11 DIAGNOSIS — G60.3 IDIOPATHIC PROGRESSIVE NEUROPATHY: ICD-10-CM

## 2025-08-11 RX ORDER — PREGABALIN 75 MG/1
75 CAPSULE ORAL 2 TIMES DAILY
Qty: 180 CAPSULE | Refills: 0 | Status: SHIPPED | OUTPATIENT
Start: 2025-08-11 | End: 2026-08-06

## 2025-08-19 DIAGNOSIS — J45.20 MILD INTERMITTENT REACTIVE AIRWAY DISEASE WITHOUT COMPLICATION (HHS-HCC): ICD-10-CM

## 2025-08-19 DIAGNOSIS — T78.40XS ALLERGY, SEQUELA: ICD-10-CM

## 2025-08-19 RX ORDER — MONTELUKAST SODIUM 10 MG/1
10 TABLET ORAL NIGHTLY
Qty: 90 TABLET | Refills: 3 | Status: SHIPPED | OUTPATIENT
Start: 2025-08-19 | End: 2026-08-19

## 2025-08-22 DIAGNOSIS — I10 BENIGN ESSENTIAL HYPERTENSION: ICD-10-CM

## 2025-08-25 RX ORDER — METOPROLOL SUCCINATE 50 MG/1
50 TABLET, EXTENDED RELEASE ORAL 2 TIMES DAILY
Qty: 180 TABLET | Refills: 3 | Status: SHIPPED | OUTPATIENT
Start: 2025-08-25 | End: 2026-08-25

## 2025-10-27 ENCOUNTER — APPOINTMENT (OUTPATIENT)
Age: 80
End: 2025-10-27
Payer: MEDICARE